# Patient Record
Sex: FEMALE | Race: WHITE | Employment: OTHER | URBAN - METROPOLITAN AREA
[De-identification: names, ages, dates, MRNs, and addresses within clinical notes are randomized per-mention and may not be internally consistent; named-entity substitution may affect disease eponyms.]

---

## 2018-09-26 ENCOUNTER — HOSPITAL ENCOUNTER (OUTPATIENT)
Age: 73
Discharge: HOME | End: 2018-09-28
Payer: COMMERCIAL

## 2018-09-26 DIAGNOSIS — I10 ESSENTIAL HYPERTENSION: ICD-10-CM

## 2018-09-26 DIAGNOSIS — E55.9 VITAMIN D DEFICIENCY: ICD-10-CM

## 2018-09-26 DIAGNOSIS — G44.209 TENSION-TYPE HEADACHE, NOT INTRACTABLE, UNSPECIFIED CHRONICITY PATTERN: ICD-10-CM

## 2018-09-26 DIAGNOSIS — E66.09 CLASS 2 OBESITY DUE TO EXCESS CALORIES WITHOUT SERIOUS COMORBIDITY WITH BODY MASS INDEX (BMI) OF 38.0 TO 38.9 IN ADULT: ICD-10-CM

## 2018-09-26 DIAGNOSIS — E78.5 HYPERLIPIDEMIA, UNSPECIFIED HYPERLIPIDEMIA TYPE: Chronic | ICD-10-CM

## 2018-09-26 DIAGNOSIS — R42 DIZZINESS: ICD-10-CM

## 2018-09-26 PROBLEM — E66.812 CLASS 2 OBESITY DUE TO EXCESS CALORIES WITHOUT SERIOUS COMORBIDITY WITH BODY MASS INDEX (BMI) OF 38.0 TO 38.9 IN ADULT: Status: ACTIVE | Noted: 2018-09-26

## 2018-09-26 PROBLEM — F32.9 REACTIVE DEPRESSION: Status: ACTIVE | Noted: 2018-09-26

## 2018-09-26 PROBLEM — R19.7 DIARRHEA: Status: ACTIVE | Noted: 2018-09-26

## 2018-09-26 LAB
BASOPHILS ABSOLUTE: 0.05 E9/L (ref 0–0.2)
BASOPHILS RELATIVE PERCENT: 0.5 % (ref 0–2)
EOSINOPHILS ABSOLUTE: 0.15 E9/L (ref 0.05–0.5)
EOSINOPHILS RELATIVE PERCENT: 1.6 % (ref 0–6)
HBA1C MFR BLD: 5.8 % (ref 4–5.6)
HCT VFR BLD CALC: 44.1 % (ref 34–48)
HEMOGLOBIN: 14.2 G/DL (ref 11.5–15.5)
IMMATURE GRANULOCYTES #: 0.01 E9/L
IMMATURE GRANULOCYTES %: 0.1 % (ref 0–5)
LYMPHOCYTES ABSOLUTE: 3.18 E9/L (ref 1.5–4)
LYMPHOCYTES RELATIVE PERCENT: 34.3 % (ref 20–42)
MCH RBC QN AUTO: 30.3 PG (ref 26–35)
MCHC RBC AUTO-ENTMCNC: 32.2 % (ref 32–34.5)
MCV RBC AUTO: 94.2 FL (ref 80–99.9)
MONOCYTES ABSOLUTE: 0.71 E9/L (ref 0.1–0.95)
MONOCYTES RELATIVE PERCENT: 7.7 % (ref 2–12)
NEUTROPHILS ABSOLUTE: 5.18 E9/L (ref 1.8–7.3)
NEUTROPHILS RELATIVE PERCENT: 55.8 % (ref 43–80)
PDW BLD-RTO: 15 FL (ref 11.5–15)
PLATELET # BLD: 280 E9/L (ref 130–450)
PMV BLD AUTO: 11.9 FL (ref 7–12)
RBC # BLD: 4.68 E12/L (ref 3.5–5.5)
WBC # BLD: 9.3 E9/L (ref 4.5–11.5)

## 2018-09-26 PROCEDURE — 83036 HEMOGLOBIN GLYCOSYLATED A1C: CPT

## 2018-09-26 PROCEDURE — 81015 MICROSCOPIC EXAM OF URINE: CPT

## 2018-09-26 PROCEDURE — 36415 COLL VENOUS BLD VENIPUNCTURE: CPT

## 2018-09-26 PROCEDURE — 80053 COMPREHEN METABOLIC PANEL: CPT

## 2018-09-26 PROCEDURE — 80061 LIPID PANEL: CPT

## 2018-09-26 PROCEDURE — 83735 ASSAY OF MAGNESIUM: CPT

## 2018-09-26 PROCEDURE — 85651 RBC SED RATE NONAUTOMATED: CPT

## 2018-09-26 PROCEDURE — 85025 COMPLETE CBC W/AUTO DIFF WBC: CPT

## 2018-09-26 PROCEDURE — 84443 ASSAY THYROID STIM HORMONE: CPT

## 2018-09-26 PROCEDURE — 82306 VITAMIN D 25 HYDROXY: CPT

## 2018-09-27 LAB
ALBUMIN SERPL-MCNC: 4.1 G/DL (ref 3.5–5.2)
ALP BLD-CCNC: 104 U/L (ref 35–104)
ALT SERPL-CCNC: 19 U/L (ref 0–32)
ANION GAP SERPL CALCULATED.3IONS-SCNC: 16 MMOL/L (ref 7–16)
AST SERPL-CCNC: 20 U/L (ref 0–31)
BACTERIA: ABNORMAL /HPF
BILIRUB SERPL-MCNC: 0.5 MG/DL (ref 0–1.2)
BUN BLDV-MCNC: 17 MG/DL (ref 8–23)
CALCIUM SERPL-MCNC: 9.2 MG/DL (ref 8.6–10.2)
CHLORIDE BLD-SCNC: 103 MMOL/L (ref 98–107)
CHOLESTEROL, TOTAL: 124 MG/DL (ref 0–199)
CO2: 22 MMOL/L (ref 22–29)
CREAT SERPL-MCNC: 1 MG/DL (ref 0.5–1)
EPITHELIAL CELLS, UA: ABNORMAL /HPF
GFR AFRICAN AMERICAN: >60
GFR NON-AFRICAN AMERICAN: 55 ML/MIN/1.73
GLUCOSE BLD-MCNC: 83 MG/DL (ref 74–109)
HDLC SERPL-MCNC: 53 MG/DL
LDL CHOLESTEROL CALCULATED: 55 MG/DL (ref 0–99)
MAGNESIUM: 1.8 MG/DL (ref 1.6–2.6)
POTASSIUM SERPL-SCNC: 4.3 MMOL/L (ref 3.5–5)
RBC UA: ABNORMAL /HPF (ref 0–2)
SEDIMENTATION RATE, ERYTHROCYTE: 27 MM/HR (ref 0–20)
SODIUM BLD-SCNC: 141 MMOL/L (ref 132–146)
TOTAL PROTEIN: 7.8 G/DL (ref 6.4–8.3)
TRIGL SERPL-MCNC: 78 MG/DL (ref 0–149)
TSH SERPL DL<=0.05 MIU/L-ACNC: 0.86 UIU/ML (ref 0.27–4.2)
VITAMIN D 25-HYDROXY: 37 NG/ML (ref 30–100)
VLDLC SERPL CALC-MCNC: 16 MG/DL
WBC UA: >20 /HPF (ref 0–5)

## 2019-01-02 PROBLEM — F33.41 RECURRENT MAJOR DEPRESSIVE DISORDER, IN PARTIAL REMISSION (HCC): Status: ACTIVE | Noted: 2019-01-02

## 2019-10-01 ENCOUNTER — OFFICE VISIT (OUTPATIENT)
Dept: PHYSICAL MEDICINE AND REHAB | Age: 74
End: 2019-10-01
Payer: COMMERCIAL

## 2019-10-01 VITALS
SYSTOLIC BLOOD PRESSURE: 106 MMHG | HEART RATE: 75 BPM | BODY MASS INDEX: 35.44 KG/M2 | HEIGHT: 63 IN | DIASTOLIC BLOOD PRESSURE: 62 MMHG | WEIGHT: 200 LBS

## 2019-10-01 DIAGNOSIS — G56.03 BILATERAL CARPAL TUNNEL SYNDROME: Primary | ICD-10-CM

## 2019-10-01 PROCEDURE — 99203 OFFICE O/P NEW LOW 30 MIN: CPT | Performed by: PHYSICAL MEDICINE & REHABILITATION

## 2019-10-14 ENCOUNTER — OFFICE VISIT (OUTPATIENT)
Dept: PHYSICAL MEDICINE AND REHAB | Age: 74
End: 2019-10-14
Payer: COMMERCIAL

## 2019-10-14 VITALS — HEIGHT: 63 IN | WEIGHT: 200 LBS | BODY MASS INDEX: 35.44 KG/M2

## 2019-10-14 DIAGNOSIS — G56.03 BILATERAL CARPAL TUNNEL SYNDROME: ICD-10-CM

## 2019-10-14 PROCEDURE — 95886 MUSC TEST DONE W/N TEST COMP: CPT | Performed by: PHYSICAL MEDICINE & REHABILITATION

## 2019-10-14 PROCEDURE — 95910 NRV CNDJ TEST 7-8 STUDIES: CPT | Performed by: PHYSICAL MEDICINE & REHABILITATION

## 2019-10-15 ENCOUNTER — OFFICE VISIT (OUTPATIENT)
Dept: PHYSICAL MEDICINE AND REHAB | Age: 74
End: 2019-10-15
Payer: COMMERCIAL

## 2019-10-15 VITALS
HEART RATE: 67 BPM | DIASTOLIC BLOOD PRESSURE: 57 MMHG | BODY MASS INDEX: 35.44 KG/M2 | WEIGHT: 200 LBS | SYSTOLIC BLOOD PRESSURE: 115 MMHG | HEIGHT: 63 IN

## 2019-10-15 DIAGNOSIS — G56.01 RIGHT CARPAL TUNNEL SYNDROME: Primary | ICD-10-CM

## 2019-10-15 DIAGNOSIS — G56.01 RIGHT CARPAL TUNNEL SYNDROME: ICD-10-CM

## 2019-10-15 PROCEDURE — 20526 THER INJECTION CARP TUNNEL: CPT | Performed by: PHYSICAL MEDICINE & REHABILITATION

## 2019-10-15 RX ORDER — LIDOCAINE HYDROCHLORIDE 10 MG/ML
2 INJECTION, SOLUTION INFILTRATION; PERINEURAL ONCE
Status: COMPLETED | OUTPATIENT
Start: 2019-10-15 | End: 2019-10-15

## 2019-10-15 RX ORDER — TRIAMCINOLONE ACETONIDE 40 MG/ML
40 INJECTION, SUSPENSION INTRA-ARTICULAR; INTRAMUSCULAR ONCE
Status: COMPLETED | OUTPATIENT
Start: 2019-10-15 | End: 2019-10-15

## 2019-10-15 RX ADMIN — TRIAMCINOLONE ACETONIDE 40 MG: 40 INJECTION, SUSPENSION INTRA-ARTICULAR; INTRAMUSCULAR at 14:41

## 2019-10-15 RX ADMIN — LIDOCAINE HYDROCHLORIDE 2 ML: 10 INJECTION, SOLUTION INFILTRATION; PERINEURAL at 14:40

## 2019-10-22 ENCOUNTER — OFFICE VISIT (OUTPATIENT)
Dept: PHYSICAL MEDICINE AND REHAB | Age: 74
End: 2019-10-22
Payer: COMMERCIAL

## 2019-10-22 VITALS
HEIGHT: 63 IN | HEART RATE: 65 BPM | BODY MASS INDEX: 35.44 KG/M2 | DIASTOLIC BLOOD PRESSURE: 70 MMHG | WEIGHT: 200 LBS | SYSTOLIC BLOOD PRESSURE: 122 MMHG

## 2019-10-22 DIAGNOSIS — G56.02 LEFT CARPAL TUNNEL SYNDROME: ICD-10-CM

## 2019-10-22 DIAGNOSIS — G56.02 LEFT CARPAL TUNNEL SYNDROME: Primary | ICD-10-CM

## 2019-10-22 PROCEDURE — 20526 THER INJECTION CARP TUNNEL: CPT | Performed by: PHYSICAL MEDICINE & REHABILITATION

## 2019-10-22 RX ORDER — TRIAMCINOLONE ACETONIDE 40 MG/ML
40 INJECTION, SUSPENSION INTRA-ARTICULAR; INTRAMUSCULAR ONCE
Status: COMPLETED | OUTPATIENT
Start: 2019-10-22 | End: 2019-10-22

## 2019-10-22 RX ORDER — LIDOCAINE HYDROCHLORIDE 10 MG/ML
2 INJECTION, SOLUTION INFILTRATION; PERINEURAL ONCE
Status: COMPLETED | OUTPATIENT
Start: 2019-10-22 | End: 2019-10-22

## 2019-10-22 RX ADMIN — LIDOCAINE HYDROCHLORIDE 2 ML: 10 INJECTION, SOLUTION INFILTRATION; PERINEURAL at 14:23

## 2019-10-22 RX ADMIN — TRIAMCINOLONE ACETONIDE 40 MG: 40 INJECTION, SUSPENSION INTRA-ARTICULAR; INTRAMUSCULAR at 14:23

## 2020-03-25 ENCOUNTER — HOSPITAL ENCOUNTER (OUTPATIENT)
Age: 75
Discharge: HOME | End: 2020-03-27
Payer: COMMERCIAL

## 2020-03-25 LAB
ALBUMIN SERPL-MCNC: 4.1 G/DL (ref 3.5–5.2)
ALP BLD-CCNC: 108 U/L (ref 35–104)
ALT SERPL-CCNC: 24 U/L (ref 0–32)
ANION GAP SERPL CALCULATED.3IONS-SCNC: 15 MMOL/L (ref 7–16)
AST SERPL-CCNC: 25 U/L (ref 0–31)
BACTERIA: ABNORMAL /HPF
BASOPHILS ABSOLUTE: 0.05 E9/L (ref 0–0.2)
BASOPHILS RELATIVE PERCENT: 0.5 % (ref 0–2)
BILIRUB SERPL-MCNC: 0.5 MG/DL (ref 0–1.2)
BILIRUBIN URINE: NEGATIVE
BLOOD, URINE: NEGATIVE
BUN BLDV-MCNC: 17 MG/DL (ref 8–23)
CALCIUM SERPL-MCNC: 9.7 MG/DL (ref 8.6–10.2)
CHLORIDE BLD-SCNC: 103 MMOL/L (ref 98–107)
CHOLESTEROL, TOTAL: 159 MG/DL (ref 0–199)
CLARITY: CLEAR
CO2: 22 MMOL/L (ref 22–29)
COLOR: YELLOW
CREAT SERPL-MCNC: 1 MG/DL (ref 0.5–1)
EOSINOPHILS ABSOLUTE: 0.11 E9/L (ref 0.05–0.5)
EOSINOPHILS RELATIVE PERCENT: 1.1 % (ref 0–6)
GFR AFRICAN AMERICAN: >60
GFR NON-AFRICAN AMERICAN: 54 ML/MIN/1.73
GLUCOSE BLD-MCNC: 95 MG/DL (ref 74–99)
GLUCOSE URINE: NEGATIVE MG/DL
HBA1C MFR BLD: 5.8 % (ref 4–5.6)
HCT VFR BLD CALC: 46.6 % (ref 34–48)
HDLC SERPL-MCNC: 51 MG/DL
HEMOGLOBIN: 14.6 G/DL (ref 11.5–15.5)
IMMATURE GRANULOCYTES #: 0.03 E9/L
IMMATURE GRANULOCYTES %: 0.3 % (ref 0–5)
KETONES, URINE: NEGATIVE MG/DL
LDL CHOLESTEROL CALCULATED: 88 MG/DL (ref 0–99)
LEUKOCYTE ESTERASE, URINE: ABNORMAL
LYMPHOCYTES ABSOLUTE: 3.11 E9/L (ref 1.5–4)
LYMPHOCYTES RELATIVE PERCENT: 31.5 % (ref 20–42)
MCH RBC QN AUTO: 30.4 PG (ref 26–35)
MCHC RBC AUTO-ENTMCNC: 31.3 % (ref 32–34.5)
MCV RBC AUTO: 96.9 FL (ref 80–99.9)
MONOCYTES ABSOLUTE: 0.7 E9/L (ref 0.1–0.95)
MONOCYTES RELATIVE PERCENT: 7.1 % (ref 2–12)
NEUTROPHILS ABSOLUTE: 5.88 E9/L (ref 1.8–7.3)
NEUTROPHILS RELATIVE PERCENT: 59.5 % (ref 43–80)
NITRITE, URINE: NEGATIVE
PDW BLD-RTO: 14.1 FL (ref 11.5–15)
PH UA: 5.5 (ref 5–9)
PLATELET # BLD: 331 E9/L (ref 130–450)
PMV BLD AUTO: 11.8 FL (ref 7–12)
POTASSIUM SERPL-SCNC: 4.8 MMOL/L (ref 3.5–5)
PROTEIN UA: NEGATIVE MG/DL
RBC # BLD: 4.81 E12/L (ref 3.5–5.5)
RBC UA: ABNORMAL /HPF (ref 0–2)
SEDIMENTATION RATE, ERYTHROCYTE: 39 MM/HR (ref 0–20)
SODIUM BLD-SCNC: 140 MMOL/L (ref 132–146)
SPECIFIC GRAVITY UA: >=1.03 (ref 1–1.03)
TOTAL PROTEIN: 8.4 G/DL (ref 6.4–8.3)
TRIGL SERPL-MCNC: 102 MG/DL (ref 0–149)
TSH SERPL DL<=0.05 MIU/L-ACNC: 1.15 UIU/ML (ref 0.27–4.2)
UROBILINOGEN, URINE: 0.2 E.U./DL
VITAMIN D 25-HYDROXY: 35 NG/ML (ref 30–100)
VLDLC SERPL CALC-MCNC: 20 MG/DL
WBC # BLD: 9.9 E9/L (ref 4.5–11.5)
WBC UA: >20 /HPF (ref 0–5)

## 2020-03-25 PROCEDURE — 86787 VARICELLA-ZOSTER ANTIBODY: CPT

## 2020-03-25 PROCEDURE — 80053 COMPREHEN METABOLIC PANEL: CPT

## 2020-03-25 PROCEDURE — 83036 HEMOGLOBIN GLYCOSYLATED A1C: CPT

## 2020-03-25 PROCEDURE — 81001 URINALYSIS AUTO W/SCOPE: CPT

## 2020-03-25 PROCEDURE — 86695 HERPES SIMPLEX TYPE 1 TEST: CPT

## 2020-03-25 PROCEDURE — 86696 HERPES SIMPLEX TYPE 2 TEST: CPT

## 2020-03-25 PROCEDURE — 85651 RBC SED RATE NONAUTOMATED: CPT

## 2020-03-25 PROCEDURE — 84443 ASSAY THYROID STIM HORMONE: CPT

## 2020-03-25 PROCEDURE — 80061 LIPID PANEL: CPT

## 2020-03-25 PROCEDURE — 85025 COMPLETE CBC W/AUTO DIFF WBC: CPT

## 2020-03-25 PROCEDURE — 86694 HERPES SIMPLEX NES ANTBDY: CPT

## 2020-03-25 PROCEDURE — 82306 VITAMIN D 25 HYDROXY: CPT

## 2020-03-26 LAB — VARICELLA-ZOSTER VIRUS AB, IGG: NORMAL

## 2020-03-29 LAB
HERPES TYPE 1/2 IGM COMBINED: 0.63 IV
HERPES TYPE I/II IGG COMBINED: >22.4 IV
HSV 1 GLYCOPROTEIN G AB IGG: 55.5 IV
HSV 2 GLYCOPROTEIN G AB IGG: 0.15 IV

## 2020-03-31 LAB — VARICELLA ZOSTER AB IGM: 0.6 ISR

## 2020-09-28 ENCOUNTER — OFFICE VISIT (OUTPATIENT)
Dept: PHYSICAL MEDICINE AND REHAB | Age: 75
End: 2020-09-28
Payer: COMMERCIAL

## 2020-09-28 VITALS
DIASTOLIC BLOOD PRESSURE: 75 MMHG | BODY MASS INDEX: 38.08 KG/M2 | RESPIRATION RATE: 20 BRPM | HEIGHT: 63 IN | SYSTOLIC BLOOD PRESSURE: 132 MMHG | WEIGHT: 214.9 LBS | HEART RATE: 80 BPM

## 2020-09-28 PROCEDURE — 99213 OFFICE O/P EST LOW 20 MIN: CPT | Performed by: PHYSICAL MEDICINE & REHABILITATION

## 2020-09-28 RX ORDER — CALCIUM CARBONATE 500(1250)
500 TABLET ORAL DAILY
COMMUNITY
End: 2021-07-07

## 2020-09-28 NOTE — PROGRESS NOTES
Romario Lopez, [id number] [facility]  [address] [facility]  Phone: [phone]  Fax: [phone]    PCP: Je Stoddard DO  Date of visit: 9/28/20    Chief Complaint   Patient presents with    Hand Pain     followup      Interval:   Patient presents today for worsening right hand pain and weakness. Her pain restarted 6 weeks ago. She had right carpal tunnel injection last year and did well. She is not interested in any further injections. Worse at night and in the morning. The pain is rated Pain Score:  10 - Worst pain ever. The numbness in in her fingers. She reports dropping things. Nothing makes it better. The prior workup has included: EMG     The prior treatment has included:  Bracing   CT injection    No Known Allergies    Current Outpatient Medications   Medication Sig Dispense Refill    calcium carbonate (OSCAL) 500 MG TABS tablet Take 500 mg by mouth daily      atorvastatin (LIPITOR) 40 MG tablet Take 1 tablet by mouth daily 90 tablet 1    spironolactone (ALDACTONE) 25 MG tablet TAKE ONE TABLET BY MOUTH DAILY 90 tablet 0    atenolol (TENORMIN) 25 MG tablet TAKE ONE TABLET BY MOUTH EVERY DAY 90 tablet 1    citalopram (CELEXA) 20 MG tablet Indications: 1 and 1/2 tab daily Pt is to take 1 and 1/2 tabs daily (30mg) 135 tablet 2    ramipril (ALTACE) 2.5 MG capsule Take 1 capsule by mouth daily 90 capsule 2    meloxicam (MOBIC) 7.5 MG tablet Take 1 tablet by mouth daily as needed for Pain 90 tablet 1    Biotin 77120 MCG TABS Take 10,000 mcg by mouth daily      Glucosamine-Chondroit-Vit C-Mn (GLUCOSAMINE 1500 COMPLEX) CAPS Take 1 capsule by mouth       No current facility-administered medications for this visit.         Past Medical History:   Diagnosis Date    Hypertension     Mitral valve regurgitation     Psychiatric problem     depression       Past Surgical History:   Procedure Laterality Date    BLADDER REPAIR  CATARACT REMOVAL WITH IMPLANT      bilateral    CERVICAL SPINE SURGERY      6th and 7th fusion    CHOLECYSTECTOMY      ECHO COMPL W DOP COLOR FLOW  2013         HERNIA REPAIR      HYSTERECTOMY      NERVE BLOCK Left 6 9 16    sacroiliac joint #1       Family History   Problem Relation Age of Onset    Other Mother         CHF  at 80    Cancer Father          of pancreatic cancer at 67    Diabetes Sister     Other Sister         Blood clots    Stroke Sister     Stroke Brother          of massive stroke at 62    No Known Problems Brother        Social History     Tobacco Use    Smoking status: Never Smoker    Smokeless tobacco: Never Used   Substance Use Topics    Alcohol use: No    Drug use: No          Functional Status: The patient is able to ambulate and perform activities of daily living without the use of an assistive device. 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 ROS:    Constitutional: Denies fevers, chills, night sweats, unintentional weight loss     Skin: Denies rash or skin changes     Eyes: Denies vision changes    Ears/Nose/Throat: Denies nasal congestion or sore throat     Respiratory: Denies SOB or cough     Cardiovascular: Denies CP, palpitations, edema      Gastrointestinal: Denies abdominal pain,  N/V, constipation, or diarrhea    Genitourinary: Denies urinary symptoms    Neurologic: See HPI.     MSK: See HPI. Psychiatric: Denies sleep disturbance, anxiety, depression    Hematologic/Lymphatic/Immunologic: Denies bruising       Physical Exam:   Blood pressure 132/75, pulse 80, resp. rate 20, height 5' 3\" (1.6 m), weight 214 lb 14.4 oz (97.5 kg), not currently breastfeeding. General: well developed and well nourished in no acute distress. HEENT: No rhinorrhea, sneezing, yawning, or lacrimation. No scleral icterus or conjunctival injection. Resp: symmetrical chest expansion, unlabored breathing, respirations unlabored.

## 2020-10-06 ENCOUNTER — OFFICE VISIT (OUTPATIENT)
Dept: ORTHOPEDIC SURGERY | Age: 75
End: 2020-10-06
Payer: MEDICARE

## 2020-10-06 VITALS — BODY MASS INDEX: 37.92 KG/M2 | HEIGHT: 63 IN | WEIGHT: 214 LBS

## 2020-10-06 PROBLEM — G56.01 RIGHT CARPAL TUNNEL SYNDROME: Status: ACTIVE | Noted: 2020-10-06

## 2020-10-06 PROBLEM — G56.02 LEFT CARPAL TUNNEL SYNDROME: Status: ACTIVE | Noted: 2020-10-06

## 2020-10-06 PROBLEM — E66.01 MORBIDLY OBESE (HCC): Status: ACTIVE | Noted: 2018-09-26

## 2020-10-06 PROCEDURE — 99204 OFFICE O/P NEW MOD 45 MIN: CPT | Performed by: ORTHOPAEDIC SURGERY

## 2020-10-06 NOTE — PROGRESS NOTES
.  Chief Complaint   Patient presents with    Carpal Tunnel     Bilateral carpal tunnel with right worse then left. C/o tingling numbing and pain. She has tried wrist bracews without much relief. Symptoms have been going on for a couple years. Patient also had injections in the carpal tunnel which hlelped for a couple weeks. Bonita Thornton is a 69y.o. year old  female who presents for evaluation of bilateral wrist pain. she reports this started several months ago. she does not remember a specific injury that started the pain. .  The injury was none. The pain is located mainly over 1-3 . The pain is worse with numbness and better with rest.  The patient has tried csi and bracing. The treatment has not been effective. The patient is right dominant.     Past Medical History:   Diagnosis Date    Hypertension     Mitral valve regurgitation     Psychiatric problem     depression     Past Surgical History:   Procedure Laterality Date    BLADDER REPAIR      CATARACT REMOVAL WITH IMPLANT      bilateral    CERVICAL SPINE SURGERY      6th and 7th fusion    CHOLECYSTECTOMY      ECHO COMPL W DOP COLOR FLOW  12/18/2013         HERNIA REPAIR      HYSTERECTOMY      NERVE BLOCK Left 6 9 16    sacroiliac joint #1       Current Outpatient Medications:     calcium carbonate (OSCAL) 500 MG TABS tablet, Take 500 mg by mouth daily, Disp: , Rfl:     atorvastatin (LIPITOR) 40 MG tablet, Take 1 tablet by mouth daily, Disp: 90 tablet, Rfl: 1    spironolactone (ALDACTONE) 25 MG tablet, TAKE ONE TABLET BY MOUTH DAILY, Disp: 90 tablet, Rfl: 0    atenolol (TENORMIN) 25 MG tablet, TAKE ONE TABLET BY MOUTH EVERY DAY, Disp: 90 tablet, Rfl: 1    citalopram (CELEXA) 20 MG tablet, Indications: 1 and 1/2 tab daily Pt is to take 1 and 1/2 tabs daily (30mg), Disp: 135 tablet, Rfl: 2    ramipril (ALTACE) 2.5 MG capsule, Take 1 capsule by mouth daily, Disp: 90 capsule, Rfl: 2    meloxicam (MOBIC) 7.5 MG tablet, Take 1 tablet by mouth daily as needed for Pain, Disp: 90 tablet, Rfl: 1    Biotin 22125 MCG TABS, Take 10,000 mcg by mouth daily, Disp: , Rfl:     Glucosamine-Chondroit-Vit C-Mn (GLUCOSAMINE 1500 COMPLEX) CAPS, Take 1 capsule by mouth, Disp: , Rfl:   No Known Allergies  Social History     Socioeconomic History    Marital status:      Spouse name: Not on file    Number of children: 3    Years of education: 15    Highest education level: Not on file   Occupational History    Not on file   Social Needs    Financial resource strain: Not hard at all   ColibrÃ­ insecurity     Worry: Never true     Inability: Never true   [employer] needs     Medical: No     Non-medical: No   Tobacco Use    Smoking status: Never Smoker    Smokeless tobacco: Never Used   Substance and Sexual Activity    Alcohol use: No    Drug use: No    Sexual activity: Not Currently     Partners: Male   Lifestyle    Physical activity     Days per week: 5 days     Minutes per session: 30 min    Stress: Not at all   Relationships    Social connections     Talks on phone: Twice a week     Gets together: More than three times a week     Attends [employer] service: More than 4 times per year     Active member of club or organization: No     Attends meetings of clubs or organizations: Never     Relationship status:      Intimate partner violence     Fear of current or ex partner: No     Emotionally abused: No     Physically abused: No     Forced sexual activity: No   Other Topics Concern    Not on file   Social History Narrative    Not on file     Family History   Problem Relation Age of Onset    Other Mother         CHF  at 80    Cancer Father          of pancreatic cancer at 67    Diabetes Sister     Other Sister         Blood clots    Stroke Sister     Stroke Brother          of massive stroke at 62    No Known Problems Brother        REVIEW OF SYSTEMS:     General/Constitution:  (-)weight loss, (-)fever, (-)chills, (-)weakness. Skin: (-) rash,(-) psoriasis,(-) eczema, (-)skin cancer. Musculoskeletal: (-) fractures,  (-) dislocations,(-) collagen vascular disease, (-) fibromyalgia, (-) multiple sclerosis, (-) muscular dystrophy, (-) RSD,(-) joint pain (-)swelling, (-) joint pain,swelling. Neurologic: (-) epilepsy, (-)seizures,(-) brain tumor,(-) TIA, (-)stroke, (-)headaches, (-)Parkinson disease,(-) memory loss, (-) LOC. Cardiovascular: (-) Chest pain, (-) swelling in legs/feet, (-) SOB, (-) cramping in legs/feet with walking. Respiratory: (-) SOB, (-) Coughing, (-) night sweats. GI: (-) nausea, (-) vomiting, (-) diarrhea, (-) blood in stool, (-) gastric ulcer. Psychiatric: (-) Depression, (-) Anxiety, (-) bipolar disease, (-) Alzheimer's Disease  Allergic/Immunologic: (-) allergies latex, (-) allergies metal, (-) skin sensitivity. Hematlogic: (-) anemia, (-) blood transfusion, (-) DVT/PE, (-) Clotting disorders      Subjective:  _Ht 5' 3\" (1.6 m)   Wt 214 lb (97.1 kg)   BMI 37.91 kg/m²  Vital signs are stable. In general, patient is awake, alert and oriented X3, in no apparent distress. Examination of HENT reveals normocephalic, atraumatic. PERRLA/EOMI sclera are white. Conjunctivae are clear. TM's are intact. Pharynx is pink and moist.  Uvula and tongue are midline. Heart: Positive S1 and positive S2 with regular rate and rhythm. Lungs: Clear to auscultation bilaterally without rales, rhonchi or wheezes. Abdomen: soft, nontender. Positive bowel sounds. No organomegaly. No guarding or rigidity. Constitution:  Ht 5' 3\" (1.6 m)   Wt 214 lb (97.1 kg)   BMI 37.91 kg/m²     Psycihatric:  The patient is alert and oriented x 3, appears to be stated age and in no distress. Respiratory:  Respiratory effort is not labored. Patient is not gasping. Palpation of the chest reveals no tactile fremitus. Skin:  Upon inspection: the skin appears warm, dry and intact.   There is not a previous scar over the affected area. There is not any cellulitis, lymphedema or cutaneous lesions noted in the lower extremities. Upon palpation there is no induration noted. Neurologic:  Motor exam of the upper extremities show: The reflexes in biceps/triceps/brachioradialis are equal and symmetric. Sensory exam C5-T1 are normal bilaterally. Cardiovascular: The vascular exam is normal and is well perfused to distal extremities. There are 2+ radial pulses bilaterally, and motor and sensation is intact to ulnar, and radial, musclocutaneus, and axillary nerve distribution and grossly symmetric bilaterally. There is cap refill noted less than two seconds in all digits. There is not edema of the bilateral upper extremities. There is not varicosities noted in the distal extremities. Median nerve distribution altered    Lymph:  Upon palpation,  there is no lymphadenopathy noted in bilateral upper extremities. Musculoskeletal:  Gait: normal; examination of the nails and digits reveal no cyanosis or clubbing. Cervical Exam:  On physical exam, Deborah Betancourt is well-developed, well-nourished, oriented to person, place and time. her gait is normal.  On evaluation of her cervical spine, she has full range of motion of the cervical spine without pain. There is no cervical tenderness to palpation. Shoulder Exam:  On evaluation of her bilaterally upper extremities, her bilateral shoulder has no deformity. There is not evidence of scapular dyskinesis. There is not muscle atrophy in shoulder girdle. The range of motion for the Right Shoulder is 150/50/t8 and for the Left shoulder is 150/50/t8. Right shoulder Motor strength is 5/5 in the supraspinatus, 5/5 internal rotation and 5/5 in external rotation, and Left shoulder motor strength 5/5 in supraspinatus, 5/5 in internal rotation, 5/5 in external rotation. Elbow exam:  Evaluation of the elbow, reveals no signs of swelling or deformity. ROM is 0-140. electrodiagnostic evidence for C5-T1 motor radiculopathy in bilateral upper extremities.      Previous Study: None       Follow up EMG is recommended if clinically indicated.      Discussed treatment options including conservative vs surgical treatment. She wants to try braces and injections. Will schedule. Radiographic findings reviewed with patient    Impression:   Encounter Diagnoses   Name Primary?  Right carpal tunnel syndrome Yes    Left carpal tunnel syndrome        Plan: Natural history and expected course discussed. Questions answered. Educational materials distributed. Rest, ice, compression, and elevation (RICE) therapy. Reduction in offending activity discussed. I had a lengthy discussion with the patient regarding their diagnosis. I explained treatment options including surgical vs non surgical treatment. I reviewed in detail the risks and benefits and outlined the procedure in detail with expected outcomes and possible complications. I also discussed non surgical treatment such as injections (CSI), physical therapy, topical creams and NSAID's. They have elected for surgical management at this time. We will perform a Right carpal tunnel release 11/3/2020. The risks and benefits were reviewed with the patient such as:  DVT, infection,  injuries to blood vessels and nerves, non relief of symptoms, continued pain, worsening of symptoms. The patient was counseled at length about the risks of sujit Covid-19 during their perioperative period and any recovery window from their procedure. The patient was made aware that sujit Covid-19  may worsen their prognosis for recovering from their procedure  and lend to a higher morbidity and/or mortality risk. All material risks, benefits, and reasonable alternatives including postponing the procedure were discussed. The patient does wish to proceed with the procedure at this time.

## 2020-10-28 ENCOUNTER — HOSPITAL ENCOUNTER (OUTPATIENT)
Age: 75
Discharge: HOME | End: 2020-10-28
Payer: MEDICARE

## 2020-10-28 ENCOUNTER — HOSPITAL ENCOUNTER (OUTPATIENT)
Age: 75
Discharge: HOME | End: 2020-10-30
Payer: MEDICARE

## 2020-10-28 LAB
ANION GAP SERPL CALCULATED.3IONS-SCNC: 10 MMOL/L (ref 7–16)
BUN BLDV-MCNC: 18 MG/DL (ref 8–23)
CALCIUM SERPL-MCNC: 9.6 MG/DL (ref 8.6–10.2)
CHLORIDE BLD-SCNC: 101 MMOL/L (ref 98–107)
CO2: 27 MMOL/L (ref 22–29)
CREAT SERPL-MCNC: 1.1 MG/DL (ref 0.5–1)
EKG ATRIAL RATE: 65 BPM
EKG P AXIS: 24 DEGREES
EKG P-R INTERVAL: 184 MS
EKG Q-T INTERVAL: 426 MS
EKG QRS DURATION: 78 MS
EKG QTC CALCULATION (BAZETT): 443 MS
EKG R AXIS: 43 DEGREES
EKG T AXIS: 116 DEGREES
EKG VENTRICULAR RATE: 65 BPM
GFR AFRICAN AMERICAN: 59
GFR NON-AFRICAN AMERICAN: 49 ML/MIN/1.73
GLUCOSE BLD-MCNC: 106 MG/DL (ref 74–99)
POTASSIUM SERPL-SCNC: 4.2 MMOL/L (ref 3.5–5)
SODIUM BLD-SCNC: 138 MMOL/L (ref 132–146)

## 2020-10-28 PROCEDURE — 80048 BASIC METABOLIC PNL TOTAL CA: CPT

## 2020-10-28 PROCEDURE — U0003 INFECTIOUS AGENT DETECTION BY NUCLEIC ACID (DNA OR RNA); SEVERE ACUTE RESPIRATORY SYNDROME CORONAVIRUS 2 (SARS-COV-2) (CORONAVIRUS DISEASE [COVID-19]), AMPLIFIED PROBE TECHNIQUE, MAKING USE OF HIGH THROUGHPUT TECHNOLOGIES AS DESCRIBED BY CMS-2020-01-R: HCPCS

## 2020-10-28 PROCEDURE — 36415 COLL VENOUS BLD VENIPUNCTURE: CPT

## 2020-10-28 PROCEDURE — 93005 ELECTROCARDIOGRAM TRACING: CPT | Performed by: ANESTHESIOLOGY

## 2020-10-30 LAB
SARS-COV-2: NOT DETECTED
SOURCE: NORMAL

## 2020-11-02 ENCOUNTER — ANESTHESIA EVENT (OUTPATIENT)
Dept: OPERATING ROOM | Age: 75
End: 2020-11-02
Payer: MEDICARE

## 2020-11-02 NOTE — ANESTHESIA PRE PROCEDURE
Department of Anesthesiology  Preprocedure Note       Name:  Cristina Madden   Age:  70 y.o.  :  1945                                          MRN:  [id number]         Date:  2020      Surgeon: Fernando Rodrigez): Lum Fothergill, DO    Procedure: Procedure(s):  RIGHT WRIST CARPAL TUNNEL RELEASE    Medications prior to admission:   Prior to Admission medications    Medication Sig Start Date End Date Taking? Authorizing Provider   calcium carbonate (OSCAL) 500 MG TABS tablet Take 500 mg by mouth daily    Historical Provider, MD   atorvastatin (LIPITOR) 40 MG tablet Take 1 tablet by mouth daily 20   Justen Cespedes, DO   spironolactone (ALDACTONE) 25 MG tablet TAKE ONE TABLET BY MOUTH DAILY 9/15/20   Justen Cespedes, DO   atenolol (TENORMIN) 25 MG tablet TAKE ONE TABLET BY MOUTH EVERY DAY 20   Justen Cespedes, DO   citalopram (CELEXA) 20 MG tablet Indications: 1 and 1/2 tab daily Pt is to take 1 and 1/2 tabs daily (30mg) 20   Justen Cespedes DO   ramipril (ALTACE) 2.5 MG capsule Take 1 capsule by mouth daily 3/25/20   Justen Cespedes DO   meloxicam (MOBIC) 7.5 MG tablet Take 1 tablet by mouth daily as needed for Pain 3/25/20   Devaughn Cespedes DO   Biotin 20524 MCG TABS Take 10,000 mcg by mouth daily    Historical Provider, MD   Glucosamine-Chondroit-Vit C-Mn (GLUCOSAMINE 1500 COMPLEX) CAPS Take 1 capsule by mouth    Historical Provider, MD       Current medications:    No current facility-administered medications for this encounter.       Current Outpatient Medications   Medication Sig Dispense Refill    calcium carbonate (OSCAL) 500 MG TABS tablet Take 500 mg by mouth daily      atorvastatin (LIPITOR) 40 MG tablet Take 1 tablet by mouth daily 90 tablet 1    spironolactone (ALDACTONE) 25 MG tablet TAKE ONE TABLET BY MOUTH DAILY 90 tablet 0    atenolol (TENORMIN) 25 MG tablet TAKE ONE TABLET BY MOUTH EVERY DAY 90 tablet 1    citalopram (CELEXA) 20 MG tablet Indications: 1 and 1/2 tab daily Pt is to take 1 and 1/2 tabs daily (30mg) 135 tablet 2    ramipril (ALTACE) 2.5 MG capsule Take 1 capsule by mouth daily 90 capsule 2    meloxicam (MOBIC) 7.5 MG tablet Take 1 tablet by mouth daily as needed for Pain 90 tablet 1    Biotin 20966 MCG TABS Take 10,000 mcg by mouth daily      Glucosamine-Chondroit-Vit C-Mn (GLUCOSAMINE 1500 COMPLEX) CAPS Take 1 capsule by mouth         Allergies:  No Known Allergies    Problem List:    Patient Active Problem List   Diagnosis Code    Anxiety F41.9    Hyperlipidemia E78.5    Vitamin D deficiency E55.9    Morbidly obese ([facility] [address] E66.01    Reactive depression F32.9    Essential hypertension I10    Diarrhea R19.7    Recurrent major depressive disorder, in partial remission ([facility] [address] F33.41    Right carpal tunnel syndrome G56.01    Left carpal tunnel syndrome G56.02       Past Medical History:        Diagnosis Date    Carpal tunnel syndrome 2020    right wrist    Hypertension     Mitral valve regurgitation     Psychiatric problem     depression       Past Surgical History:        Procedure Laterality Date    BLADDER REPAIR      CATARACT REMOVAL WITH IMPLANT  04/2020    bilateral    CERVICAL SPINE SURGERY  1980 6th and 7th fusion    CHOLECYSTECTOMY      ECHO COMPL W DOP COLOR FLOW  12/18/2013         HERNIA REPAIR      HYSTERECTOMY      NERVE BLOCK Left 6 9 16    sacroiliac joint #1       Social History:    Social History     Tobacco Use    Smoking status: Never Smoker    Smokeless tobacco: Never Used   Substance Use Topics    Alcohol use:  No                                Counseling given: Not Answered      Vital Signs (Current):   Vitals:    10/26/20 1533   Weight: 214 lb (97.1 kg)   Height: 5' 3\" (1.6 m)                                              BP Readings from Last 3 Encounters:   09/28/20 132/75   03/25/20 124/70   10/22/19 122/70       NPO Status:  >8.H                                                                               BMI:   Wt Readings from Last 3 Encounters:   10/06/20 214 lb (97.1 kg)   09/28/20 214 lb 14.4 oz (97.5 kg)   03/25/20 208 lb (94.3 kg)     Body mass index is 37.91 kg/m². CBC:   Lab Results   Component Value Date    WBC 9.9 03/25/2020    RBC 4.81 03/25/2020    HGB 14.6 03/25/2020    HCT 46.6 03/25/2020    MCV 96.9 03/25/2020    RDW 14.1 03/25/2020     03/25/2020       CMP:   Lab Results   Component Value Date     10/28/2020    K 4.2 10/28/2020     10/28/2020    CO2 27 10/28/2020    BUN 18 10/28/2020    CREATININE 1.1 10/28/2020    GFRAA 59 10/28/2020    LABGLOM 49 10/28/2020    GLUCOSE 106 10/28/2020    GLUCOSE 89 04/25/2012    PROT 8.4 03/25/2020    CALCIUM 9.6 10/28/2020    BILITOT 0.5 03/25/2020    ALKPHOS 108 03/25/2020    AST 25 03/25/2020    ALT 24 03/25/2020       POC Tests: No results for input(s): POCGLU, POCNA, POCK, POCCL, POCBUN, POCHEMO, POCHCT in the last 72 hours. Coags:   Lab Results   Component Value Date    PROTIME 13.2 12/17/2013    INR 1.3 12/17/2013    APTT 29.2 12/17/2013       HCG (If Applicable): No results found for: PREGTESTUR, PREGSERUM, HCG, HCGQUANT     ABGs: No results found for: PHART, PO2ART, EHK7EYH, BLJ3OAA, BEART, T7FISGLQ     Type & Screen (If Applicable):  No results found for: LABABO, LABRH    Drug/Infectious Status (If Applicable):  No results found for: HIV, HEPCAB    COVID-19 Screening (If Applicable):   Lab Results   Component Value Date    COVID19 Not Detected 10/28/2020         Anesthesia Evaluation  Patient summary reviewed no history of anesthetic complications:   Airway: Mallampati: III  TM distance: >3 FB   Neck ROM: full  Mouth opening: > = 3 FB Dental:          Pulmonary:Negative Pulmonary ROS breath sounds clear to auscultation      (-) not a current smoker                          ROS comment: CTA chest 12/2013:  Impression-1. No pulmonary embolus. 2. No aneurysm formation or dissection of the thoracic aorta. 3. No acute pulmonary process.

## 2020-11-03 ENCOUNTER — ANESTHESIA (OUTPATIENT)
Dept: OPERATING ROOM | Age: 75
End: 2020-11-03
Payer: MEDICARE

## 2020-11-03 ENCOUNTER — HOSPITAL ENCOUNTER (OUTPATIENT)
Age: 75
Setting detail: OUTPATIENT SURGERY
Discharge: HOME | End: 2020-11-03
Attending: ORTHOPAEDIC SURGERY | Admitting: ORTHOPAEDIC SURGERY
Payer: MEDICARE

## 2020-11-03 VITALS
OXYGEN SATURATION: 95 % | WEIGHT: 212 LBS | HEIGHT: 63 IN | SYSTOLIC BLOOD PRESSURE: 146 MMHG | RESPIRATION RATE: 14 BRPM | DIASTOLIC BLOOD PRESSURE: 78 MMHG | HEART RATE: 76 BPM | TEMPERATURE: 98.6 F | BODY MASS INDEX: 37.56 KG/M2

## 2020-11-03 VITALS
SYSTOLIC BLOOD PRESSURE: 87 MMHG | DIASTOLIC BLOOD PRESSURE: 64 MMHG | OXYGEN SATURATION: 96 % | RESPIRATION RATE: 12 BRPM

## 2020-11-03 PROCEDURE — 2580000003 HC RX 258: Performed by: ANESTHESIOLOGY

## 2020-11-03 PROCEDURE — 2500000003 HC RX 250 WO HCPCS: Performed by: NURSE ANESTHETIST, CERTIFIED REGISTERED

## 2020-11-03 PROCEDURE — 3600000012 HC SURGERY LEVEL 2 ADDTL 15MIN: Performed by: ORTHOPAEDIC SURGERY

## 2020-11-03 PROCEDURE — 6370000000 HC RX 637 (ALT 250 FOR IP): Performed by: ANESTHESIOLOGY

## 2020-11-03 PROCEDURE — 7100000010 HC PHASE II RECOVERY - FIRST 15 MIN: Performed by: ORTHOPAEDIC SURGERY

## 2020-11-03 PROCEDURE — 3700000001 HC ADD 15 MINUTES (ANESTHESIA): Performed by: ORTHOPAEDIC SURGERY

## 2020-11-03 PROCEDURE — 2709999900 HC NON-CHARGEABLE SUPPLY: Performed by: ORTHOPAEDIC SURGERY

## 2020-11-03 PROCEDURE — 64721 CARPAL TUNNEL SURGERY: CPT | Performed by: ORTHOPAEDIC SURGERY

## 2020-11-03 PROCEDURE — 6360000002 HC RX W HCPCS: Performed by: NURSE PRACTITIONER

## 2020-11-03 PROCEDURE — 7100000011 HC PHASE II RECOVERY - ADDTL 15 MIN: Performed by: ORTHOPAEDIC SURGERY

## 2020-11-03 PROCEDURE — 6360000002 HC RX W HCPCS: Performed by: NURSE ANESTHETIST, CERTIFIED REGISTERED

## 2020-11-03 PROCEDURE — 3700000000 HC ANESTHESIA ATTENDED CARE: Performed by: ORTHOPAEDIC SURGERY

## 2020-11-03 PROCEDURE — 3600000002 HC SURGERY LEVEL 2 BASE: Performed by: ORTHOPAEDIC SURGERY

## 2020-11-03 RX ORDER — PROMETHAZINE HYDROCHLORIDE 25 MG/ML
25 INJECTION, SOLUTION INTRAMUSCULAR; INTRAVENOUS
Status: DISCONTINUED | OUTPATIENT
Start: 2020-11-03 | End: 2020-11-03 | Stop reason: HOSPADM

## 2020-11-03 RX ORDER — LABETALOL HYDROCHLORIDE 5 MG/ML
5 INJECTION, SOLUTION INTRAVENOUS EVERY 10 MIN PRN
Status: DISCONTINUED | OUTPATIENT
Start: 2020-11-03 | End: 2020-11-03 | Stop reason: HOSPADM

## 2020-11-03 RX ORDER — HYDROCODONE BITARTRATE AND ACETAMINOPHEN 5; 325 MG/1; MG/1
1 TABLET ORAL EVERY 6 HOURS PRN
Qty: 28 TABLET | Refills: 0 | Status: SHIPPED | OUTPATIENT
Start: 2020-11-03 | End: 2020-11-10

## 2020-11-03 RX ORDER — HYDRALAZINE HYDROCHLORIDE 20 MG/ML
5 INJECTION INTRAMUSCULAR; INTRAVENOUS EVERY 10 MIN PRN
Status: DISCONTINUED | OUTPATIENT
Start: 2020-11-03 | End: 2020-11-03 | Stop reason: HOSPADM

## 2020-11-03 RX ORDER — FENTANYL CITRATE 50 UG/ML
INJECTION, SOLUTION INTRAMUSCULAR; INTRAVENOUS PRN
Status: DISCONTINUED | OUTPATIENT
Start: 2020-11-03 | End: 2020-11-03 | Stop reason: SDUPTHER

## 2020-11-03 RX ORDER — FENTANYL CITRATE 50 UG/ML
50 INJECTION, SOLUTION INTRAMUSCULAR; INTRAVENOUS EVERY 5 MIN PRN
Status: DISCONTINUED | OUTPATIENT
Start: 2020-11-03 | End: 2020-11-03 | Stop reason: HOSPADM

## 2020-11-03 RX ORDER — PROPOFOL 10 MG/ML
INJECTION, EMULSION INTRAVENOUS CONTINUOUS PRN
Status: DISCONTINUED | OUTPATIENT
Start: 2020-11-03 | End: 2020-11-03 | Stop reason: SDUPTHER

## 2020-11-03 RX ORDER — MORPHINE SULFATE 2 MG/ML
1 INJECTION, SOLUTION INTRAMUSCULAR; INTRAVENOUS EVERY 5 MIN PRN
Status: DISCONTINUED | OUTPATIENT
Start: 2020-11-03 | End: 2020-11-03 | Stop reason: HOSPADM

## 2020-11-03 RX ORDER — CEFAZOLIN SODIUM 2 G/50ML
2 SOLUTION INTRAVENOUS ONCE
Status: COMPLETED | OUTPATIENT
Start: 2020-11-03 | End: 2020-11-03

## 2020-11-03 RX ORDER — SODIUM CHLORIDE, SODIUM LACTATE, POTASSIUM CHLORIDE, CALCIUM CHLORIDE 600; 310; 30; 20 MG/100ML; MG/100ML; MG/100ML; MG/100ML
INJECTION, SOLUTION INTRAVENOUS CONTINUOUS
Status: DISCONTINUED | OUTPATIENT
Start: 2020-11-03 | End: 2020-11-03 | Stop reason: HOSPADM

## 2020-11-03 RX ORDER — DIPHENHYDRAMINE HYDROCHLORIDE 50 MG/ML
12.5 INJECTION INTRAMUSCULAR; INTRAVENOUS
Status: DISCONTINUED | OUTPATIENT
Start: 2020-11-03 | End: 2020-11-03 | Stop reason: HOSPADM

## 2020-11-03 RX ORDER — HYDROCODONE BITARTRATE AND ACETAMINOPHEN 5; 325 MG/1; MG/1
2 TABLET ORAL PRN
Status: COMPLETED | OUTPATIENT
Start: 2020-11-03 | End: 2020-11-03

## 2020-11-03 RX ORDER — HYDROCODONE BITARTRATE AND ACETAMINOPHEN 5; 325 MG/1; MG/1
1 TABLET ORAL PRN
Status: COMPLETED | OUTPATIENT
Start: 2020-11-03 | End: 2020-11-03

## 2020-11-03 RX ORDER — OXYCODONE HYDROCHLORIDE AND ACETAMINOPHEN 5; 325 MG/1; MG/1
1 TABLET ORAL EVERY 4 HOURS PRN
Status: DISCONTINUED | OUTPATIENT
Start: 2020-11-03 | End: 2020-11-03 | Stop reason: HOSPADM

## 2020-11-03 RX ORDER — MEPERIDINE HYDROCHLORIDE 25 MG/ML
12.5 INJECTION INTRAMUSCULAR; INTRAVENOUS; SUBCUTANEOUS EVERY 5 MIN PRN
Status: DISCONTINUED | OUTPATIENT
Start: 2020-11-03 | End: 2020-11-03 | Stop reason: HOSPADM

## 2020-11-03 RX ORDER — LIDOCAINE HYDROCHLORIDE 5 MG/ML
INJECTION, SOLUTION INFILTRATION; INTRAVENOUS PRN
Status: DISCONTINUED | OUTPATIENT
Start: 2020-11-03 | End: 2020-11-03 | Stop reason: SDUPTHER

## 2020-11-03 RX ADMIN — CEFAZOLIN SODIUM 2 G: 2 SOLUTION INTRAVENOUS at 06:35

## 2020-11-03 RX ADMIN — PROPOFOL 75 MCG/KG/MIN: 10 INJECTION, EMULSION INTRAVENOUS at 06:38

## 2020-11-03 RX ADMIN — HYDROCODONE BITARTRATE AND ACETAMINOPHEN 1 TABLET: 5; 325 TABLET ORAL at 07:40

## 2020-11-03 RX ADMIN — LIDOCAINE HYDROCHLORIDE 50 ML: 5 INJECTION, SOLUTION INFILTRATION at 06:38

## 2020-11-03 RX ADMIN — SODIUM CHLORIDE, POTASSIUM CHLORIDE, SODIUM LACTATE AND CALCIUM CHLORIDE: 600; 310; 30; 20 INJECTION, SOLUTION INTRAVENOUS at 06:35

## 2020-11-03 RX ADMIN — FENTANYL CITRATE 50 MCG: 50 INJECTION, SOLUTION INTRAMUSCULAR; INTRAVENOUS at 06:37

## 2020-11-03 RX ADMIN — FENTANYL CITRATE 50 MCG: 50 INJECTION, SOLUTION INTRAMUSCULAR; INTRAVENOUS at 06:40

## 2020-11-03 RX ADMIN — SODIUM CHLORIDE, POTASSIUM CHLORIDE, SODIUM LACTATE AND CALCIUM CHLORIDE: 600; 310; 30; 20 INJECTION, SOLUTION INTRAVENOUS at 05:54

## 2020-11-03 ASSESSMENT — PULMONARY FUNCTION TESTS
PIF_VALUE: 1
PIF_VALUE: 0
PIF_VALUE: 1
PIF_VALUE: 0
PIF_VALUE: 1
PIF_VALUE: 0
PIF_VALUE: 1
PIF_VALUE: 0
PIF_VALUE: 1
PIF_VALUE: 0
PIF_VALUE: 1
PIF_VALUE: 0
PIF_VALUE: 1
PIF_VALUE: 1
PIF_VALUE: 0
PIF_VALUE: 0
PIF_VALUE: 1
PIF_VALUE: 0

## 2020-11-03 ASSESSMENT — PAIN SCALES - GENERAL
PAINLEVEL_OUTOF10: 0
PAINLEVEL_OUTOF10: 0
PAINLEVEL_OUTOF10: 4

## 2020-11-03 ASSESSMENT — PAIN - FUNCTIONAL ASSESSMENT: PAIN_FUNCTIONAL_ASSESSMENT: 0-10

## 2020-11-03 ASSESSMENT — LIFESTYLE VARIABLES: SMOKING_STATUS: 0

## 2020-11-03 NOTE — OP NOTE
Operative Note      Patient: Trinda Moritz  YOB: 1945  MRN: [id number]    Date of Procedure: 11/3/2020    Pre-Op Diagnosis: RIGHT CARPAL TUNNEL    Post-Op Diagnosis: Same       Procedure(s):  RIGHT WRIST CARPAL TUNNEL RELEASE    Surgeon(s): George Abel DO    Assistant:   * No surgical staff found *    Anesthesia: [place] Block    Estimated Blood Loss (mL): Minimal    Complications: None    Specimens:   * No specimens in log *    Implants:  * No implants in log *      Drains: * No LDAs found *    Findings: as above    Detailed Description of Procedure:   Below    SURGEON: CHIP DE LA CRUZ D.O.   ASSISTANT: none  PREOPERATIVE DIAGNOSIS: Right wrist carpal tunnel syndrome. POSTOPERATIVE DIAGNOSIS: Right wrist carpal tunnel syndrome. PROCEDURE: Release transverse carpal ligament, Right wrist.   ANESTHESIA: bb  ESTIMATED BLOOD LOSS: minimal in degree  COMPLICATIONS: None. Brief Hospital Course: The  patients well  known to Rui Jacinto DO's practice with persistent complaints of right wrist/hand pain and numbness. Wrsit and hand pain has failed to be relieved by non-operative conservative measures, and has began affecting daily activities of living. After examination of the patient, review of the EMG, radiologic studies, and appropriate pre-operative risk assessment, Rui Jacinto DO recommended right carpal tunnel release,  which the patient was agreeable towards. OPERATIVE PROCEDURE: The patient was brought to the operating suite and was   given anesthesia. The right arm was identified with a   preoperative time-out, the arm was prepped and draped in sterile fashion, I  outlined incision along the volar side of the wrist just ulnar to the thenar   wrist crease. I made an approximately 2 to 4-cm incision over this area through the skin   and subcutaneous tissue. I dissected that down to the level of the palmar   fascia. It was identified and I released it sharply.  I Identified the transverse carpal ligament and incised this with a 15-blade. Using tenotomy   scissors, I finished the release of the ligament proximally and distally to its full extent. I thoroughly irrigated the wound upon closure. I closed the incision with 4-0 Prolene in a horizontal mattress type fashion. Sterile dressing was placed on the wound. The patient recovered to the   recovery room without difficulty.        Electronically signed by Inga Anderson DO on 11/3/2020 at 6:38 AM

## 2020-11-03 NOTE — ANESTHESIA PROCEDURE NOTES
Peripheral Block    Patient location during procedure: OR  Staffing  Anesthesiologist: Diego Beth MD  Resident/CRNA: KELLEY Robles - CRNA  Performed: resident/CRNA   Preanesthetic Checklist  Completed: patient identified, site marked, surgical consent, pre-op evaluation, timeout performed, IV checked, risks and benefits discussed, monitors and equipment checked, anesthesia consent given, oxygen available and patient being monitored  Peripheral Block  Patient position: supine  Prep: alcohol swabs  Patient monitoring: cardiac monitor, continuous pulse ox, continuous capnometry, frequent blood pressure checks and IV access  Block type: [place] block  Laterality: right  Injection technique: single-shot  Procedures: other  Provider prep: mask  Assessment  Hemodynamics: stable  Reason for block: primary anesthetic

## 2020-11-03 NOTE — H&P
Updated H&P    . Chief Complaint   Patient presents with    Carpal Tunnel       Bilateral carpal tunnel with right worse then left. C/o tingling numbing and pain. She has tried wrist bracews without much relief. Symptoms have been going on for a couple years. Patient also had injections in the carpal tunnel which hlelped for a couple weeks.         Raghu Valentin is a 69y.o. year old  female who presents for evaluation of bilateral wrist pain. she reports this started several months ago. she does not remember a specific injury that started the pain. .  The injury was none. The pain is located mainly over 1-3 . The pain is worse with numbness and better with rest.  The patient has tried csi and bracing. The treatment has not been effective.   The patient is right dominant.     Past Medical History        Past Medical History:   Diagnosis Date    Hypertension      Mitral valve regurgitation      Psychiatric problem       depression        Past Surgical History         Past Surgical History:   Procedure Laterality Date    BLADDER REPAIR        CATARACT REMOVAL WITH IMPLANT         bilateral    CERVICAL SPINE SURGERY         6th and 7th fusion    CHOLECYSTECTOMY        ECHO COMPL W DOP COLOR FLOW   12/18/2013          HERNIA REPAIR        HYSTERECTOMY        NERVE BLOCK Left 6 9 16     sacroiliac joint #1        Current Medication     Current Outpatient Medications:     calcium carbonate (OSCAL) 500 MG TABS tablet, Take 500 mg by mouth daily, Disp: , Rfl:     atorvastatin (LIPITOR) 40 MG tablet, Take 1 tablet by mouth daily, Disp: 90 tablet, Rfl: 1    spironolactone (ALDACTONE) 25 MG tablet, TAKE ONE TABLET BY MOUTH DAILY, Disp: 90 tablet, Rfl: 0    atenolol (TENORMIN) 25 MG tablet, TAKE ONE TABLET BY MOUTH EVERY DAY, Disp: 90 tablet, Rfl: 1    citalopram (CELEXA) 20 MG tablet, Indications: 1 and 1/2 tab daily Pt is to take 1 and 1/2 tabs daily (30mg), Disp: 135 tablet, Rfl: 2    ramipril (ALTACE) 2.5 MG capsule, Take 1 capsule by mouth daily, Disp: 90 capsule, Rfl: 2    meloxicam (MOBIC) 7.5 MG tablet, Take 1 tablet by mouth daily as needed for Pain, Disp: 90 tablet, Rfl: 1    Biotin 66914 MCG TABS, Take 10,000 mcg by mouth daily, Disp: , Rfl:     Glucosamine-Chondroit-Vit C-Mn (GLUCOSAMINE 1500 COMPLEX) CAPS, Take 1 capsule by mouth, Disp: , Rfl:      No Known Allergies  Social History               Socioeconomic History    Marital status:        Spouse name: Not on file    Number of children: 3    Years of education: 15    Highest education level: Not on file   Occupational History    Not on file   Social Needs    Financial resource strain: Not hard at all   [employer] insecurity       Worry: Never true       Inability: Never true    Transportation needs       Medical: No       Non-medical: No   Tobacco Use    Smoking status: Never Smoker    Smokeless tobacco: Never Used   Substance and Sexual Activity    Alcohol use: No    Drug use: No    Sexual activity: Not Currently       Partners: Male   Lifestyle    Physical activity       Days per week: 5 days       Minutes per session: 30 min    Stress: Not at all   Relationships    Social connections       Talks on phone: Twice a week       Gets together: More than three times a week       Attends Hindu service: More than 4 times per year       Active member of club or organization: No       Attends meetings of clubs or organizations: Never       Relationship status:     Intimate partner violence       Fear of current or ex partner: No       Emotionally abused: No       Physically abused:  No       Forced sexual activity: No   Other Topics Concern    Not on file   Social History Narrative    Not on file        Family History         Family History   Problem Relation Age of Onset    Other Mother           CHF  at 80    Cancer Father            of pancreatic cancer at 67    Diabetes Sister      Other Sister         Blood clots    Stroke Sister      Stroke Brother            of massive stroke at 62    No Known Problems Brother             REVIEW OF SYSTEMS:      General/Constitution:  (-)weight loss, (-)fever, (-)chills, (-)weakness. Skin: (-) rash,(-) psoriasis,(-) eczema, (-)skin cancer. Musculoskeletal: (-) fractures,  (-) dislocations,(-) collagen vascular disease, (-) fibromyalgia, (-) multiple sclerosis, (-) muscular dystrophy, (-) RSD,(-) joint pain (-)swelling, (-) joint pain,swelling. Neurologic: (-) epilepsy, (-)seizures,(-) brain tumor,(-) TIA, (-)stroke, (-)headaches, (-)Parkinson disease,(-) memory loss, (-) LOC. Cardiovascular: (-) Chest pain, (-) swelling in legs/feet, (-) SOB, (-) cramping in legs/feet with walking. Respiratory: (-) SOB, (-) Coughing, (-) night sweats. GI: (-) nausea, (-) vomiting, (-) diarrhea, (-) blood in stool, (-) gastric ulcer. Psychiatric: (-) Depression, (-) Anxiety, (-) bipolar disease, (-) Alzheimer's Disease  Allergic/Immunologic: (-) allergies latex, (-) allergies metal, (-) skin sensitivity.   Hematlogic: (-) anemia, (-) blood transfusion, (-) DVT/PE, (-) Clotting disorders        Subjective:     Vital signs are stable.  In general, patient is awake, alert and oriented X3, in no apparent distress.  Examination of HENT reveals normocephalic, atraumatic.  PERRLA/EOMI sclera are white.  Conjunctivae are clear.  TM's are intact.  Pharynx is pink and moist.  Uvula and tongue are midline.  Heart: Positive S1 and positive S2 with regular rate and rhythm.  Lungs: Clear to auscultation bilaterally without rales, rhonchi or wheezes.  Abdomen: soft, nontender.  Positive bowel sounds.  No organomegaly.  No guarding or rigidity.        Constitution:  BP (!) 141/71   Pulse 78   Temp 96 °F (35.6 °C) (Temporal)   Resp 20   Ht 5' 3\" (1.6 m)   Wt 212 lb (96.2 kg)   SpO2 95%   BMI 37.55 kg/m²        Psycihatric:  The patient is alert and oriented x 3, appears to be stated age and in no distress.       Respiratory:  Respiratory effort is not labored. Patient is not gasping. Palpation of the chest reveals no tactile fremitus.     Skin:  Upon inspection: the skin appears warm, dry and intact. There is not a previous scar over the affected area. There is not any cellulitis, lymphedema or cutaneous lesions noted in the lower extremities. Upon palpation there is no induration noted.       Neurologic:  Motor exam of the upper extremities show: The reflexes in biceps/triceps/brachioradialis are equal and symmetric. Sensory exam C5-T1 are normal bilaterally. Cardiovascular: The vascular exam is normal and is well perfused to distal extremities. There are 2+ radial pulses bilaterally, and motor and sensation is intact to ulnar, and radial, musclocutaneus, and axillary nerve distribution and grossly symmetric bilaterally. There is cap refill noted less than two seconds in all digits. There is not edema of the bilateral upper extremities. There is not varicosities noted in the distal extremities. Median nerve distribution altered     Lymph:  Upon palpation,  there is no lymphadenopathy noted in bilateral upper extremities.       Musculoskeletal:  Gait: normal; examination of the nails and digits reveal no cyanosis or clubbing.     Cervical Exam:  On physical exam, Bonita Thornton is well-developed, well-nourished, oriented to person, place and time. her gait is normal.  On evaluation of her cervical spine, she has full range of motion of the cervical spine without pain. There is no cervical tenderness to palpation.      Shoulder Exam:  On evaluation of her bilaterally upper extremities, her bilateral shoulder has no deformity. There is not evidence of scapular dyskinesis. There is not muscle atrophy in shoulder girdle. The range of motion for the Right Shoulder is 150/50/t8 and for the Left shoulder is 150/50/t8. Right shoulder Motor strength is 5/5 in the supraspinatus, 5/5 internal rotation and 5/5 in external rotation, and Left shoulder motor strength 5/5 in supraspinatus, 5/5 in internal rotation, 5/5 in external rotation. Elbow exam:  Evaluation of the elbow, reveals no signs of swelling or deformity. ROM is 0-140. There is not instability with varus/valgus stresses. Motor strength is 5/5 with flexion/extension.      Wrist exam:  Inspection of the bilateral upper extremities, there is no evidence of deformity of the wrist.  ROM Wrist ROM R wrist DF 70, VF 70, L wrist DF 70, VF 70, R pronation 90/ supination 90, L pronation 90/supination 90. Motor strength is 5/5 with Dorsiflexion/Volarflexion/Supination/Pronation. Motor and sensation is intact and symmetric throughout the bilateral upper extremities in the median, ulnar and radial , musclcutaneous, and axillary nerve distributions.     Hand exam:  The skin overlying the hand is  intact. There is not evidence of scar, lesion, laceration, or abrasion. The motion in the small joints of the hand are intact with no stiffness or deformity. The ROM in the MCP flexion 90/ extension 0 , PIP flexion 90/ extension 0, DIP flexion 70/ extension 0. There is not rotational deformity. There is no masses or adenopathy in bilateral upper extremities. Radial pulses are 2+ and symmetric bilaterally. Capillary refill is intact and < 2 seconds. Motor strength is 5/5 with flexion and extension of the small finger joints.      Right:  Phallens sign(+), Tinnells sign (+), Median nerve compression test (+),  Finklesteins (-), CMC Grind test (-), [employer](-).    Left:     Phallens sign(+), Tinnells sign (+), Median nerve compression test (+),  Finklesteins (-), CMC Grind test (-), [employer](-).      EMG:  Diagnostic Interpretation:     This study was abnormal.      1. There is electrodiagnostic evidence for bilateral (right worse than left) sensorimotor median mononeuropathy at or about the wrist, primarily demyelinating in nature on left, mixed on the right, moderate in severity, without evidence of active denervation. It is chronic in duration. This is consistent with the clinical diagnosis of carpal tunnel syndrome. Prognosis is good if cause is alleviated.      2. There is no electrodiagnostic evidence for C5-T1 motor radiculopathy in bilateral upper extremities.      Previous Study: None       Follow up EMG is recommended if clinically indicated.      Discussed treatment options including conservative vs surgical treatment. She wants to try braces and injections. Will schedule.      Radiographic findings reviewed with patient     Impression:        Encounter Diagnoses   Name Primary?  Right carpal tunnel syndrome Yes    Left carpal tunnel syndrome           Plan: Natural history and expected course discussed. Questions answered. Educational materials distributed. Rest, ice, compression, and elevation (RICE) therapy. Reduction in offending activity discussed. I had a lengthy discussion with the patient regarding their diagnosis. I explained treatment options including surgical vs non surgical treatment. I reviewed in detail the risks and benefits and outlined the procedure in detail with expected outcomes and possible complications. I also discussed non surgical treatment such as injections (CSI), physical therapy, topical creams and NSAID's. They have elected for surgical management at this time. We will perform a Right carpal tunnel release 11/3/2020. The risks and benefits were reviewed with the patient such as:  DVT, infection,  injuries to blood vessels and nerves, non relief of symptoms, continued pain, worsening of symptoms.      The patient was counseled at length about the risks of sujit Covid-19 during their perioperative period and any recovery window from their procedure.  The patient was made aware that sujit Covid-19  may worsen their prognosis for recovering from their procedure  and lend

## 2020-11-03 NOTE — ANESTHESIA POSTPROCEDURE EVALUATION
Department of Anesthesiology  Postprocedure Note    Patient: Ade Mcguire  MRN: [id number]  YOB: 1945  Date of evaluation: 11/3/2020  Time:  8:35 AM     Procedure Summary     Date:  11/03/20 Room / Location:  [address] [place] [address] 01 / 4[address] [facility], operated by [facility]    Anesthesia Start:  6018 Anesthesia Stop:  9355    Procedure:  RIGHT WRIST CARPAL TUNNEL RELEASE (Right ) Diagnosis:  (RIGHT CARPAL TUNNEL)    Surgeon:  Tracy Arredondo DO Responsible Provider:  John Winter MD    Anesthesia Type:  MAC ASA Status:  3          Anesthesia Type: MAC    Russell Phase I: Russell Score: 10    Russell Phase II: Russell Score: 10    Last vitals: Reviewed and per EMR flowsheets.        Anesthesia Post Evaluation    Patient location during evaluation: PACU  Patient participation: complete - patient participated  Level of consciousness: awake and alert  Airway patency: patent  Nausea & Vomiting: no nausea and no vomiting  Complications: no  Cardiovascular status: blood pressure returned to baseline  Respiratory status: room air and spontaneous ventilation

## 2020-11-18 ENCOUNTER — OFFICE VISIT (OUTPATIENT)
Dept: ORTHOPEDIC SURGERY | Age: 75
End: 2020-11-18

## 2020-11-18 VITALS — WEIGHT: 212 LBS | HEIGHT: 63 IN | BODY MASS INDEX: 37.56 KG/M2

## 2020-11-18 PROCEDURE — 99024 POSTOP FOLLOW-UP VISIT: CPT | Performed by: NURSE PRACTITIONER

## 2020-11-18 NOTE — PROGRESS NOTES
Subjective:     Osiris Guillen is here for followup after right carpal tunnel surgery. The patient is not having any pain. The patient notes improvement in the following symptoms: strength, numbness, pain, sensation. The patient denies fever, wound drainage, increasing redness, pus, increasing pain, increasing swelling. Post op problems reported: none. Objective:       General :    alert, appears stated age and cooperative   Sutures:   Sutures in place and will be removed today. Incision:  healing well, no significant drainage, no dehiscence, no significant erythema   Tenderness:  none   Flexion ROM:  full range of motion   Extension ROM:  full range of motion   Effusion:  none        Assessment:     Encounter Diagnosis   Name Primary?  Right carpal tunnel syndrome Yes     Plan:   Sutures removed today. Range of motion and rehabilitation exercises discussed with the patient. HEP  Follow up in 4 weeks.   Call with any questions or concerns at [phone]

## 2020-11-18 NOTE — PATIENT INSTRUCTIONS
Patient Education        Carpal Tunnel Syndrome: Exercises  Introduction  Here are some examples of exercises for you to try. The exercises may be suggested for a condition or for rehabilitation. Start each exercise slowly. Ease off the exercises if you start to have pain. You will be told when to start these exercises and which ones will work best for you. Warm-up stretches  When you no longer have pain or numbness, you can do exercises to help prevent carpal tunnel syndrome from coming back. Do not do any stretch or movement that is uncomfortable or painful. 1. Rotate your wrist up, down, and from side to side. Repeat 4 times. 2. Stretch your fingers far apart. Relax them, and then stretch them again. Repeat 4 times. 3. Stretch your thumb by pulling it back gently, holding it, and then releasing it. Repeat 4 times. How to do the exercises  Prayer stretch   1. Start with your palms together in front of your chest just below your chin. 2. Slowly lower your hands toward your waistline, keeping your hands close to your stomach and your palms together until you feel a mild to moderate stretch under your forearms. 3. Hold for at least 15 to 30 seconds. Repeat 2 to 4 times. Wrist flexor stretch   1. Extend your arm in front of you with your palm up. 2. Bend your wrist, pointing your hand toward the floor. 3. With your other hand, gently bend your wrist farther until you feel a mild to moderate stretch in your forearm. 4. Hold for at least 15 to 30 seconds. Repeat 2 to 4 times. Wrist extensor stretch   1. Repeat steps 1 through 4 of the stretch above, but begin with your extended hand palm down. Follow-up care is a key part of your treatment and safety. Be sure to make and go to all appointments, and call your doctor if you are having problems. It's also a good idea to know your test results and keep a list of the medicines you take. Where can you learn more?   Go to [email or web address] and sign in to your [email or web address] account. Enter R902 in the [employer] box to learn more about \"Carpal Tunnel Syndrome: Exercises. \"     If you do not have an account, please click on the \"Sign Up Now\" link. Current as of: March 2, 2020               Content Version: 12.6  © 3098-5960 [employer]. Care instructions adapted under license by [facility] ([facility] [place]). If you have questions about a medical condition or this instruction, always ask your healthcare professional. [address] any warranty or liability for your use of this information.

## 2021-07-07 DIAGNOSIS — F33.41 RECURRENT MAJOR DEPRESSIVE DISORDER, IN PARTIAL REMISSION (HCC): ICD-10-CM

## 2021-07-07 DIAGNOSIS — I10 ESSENTIAL HYPERTENSION: ICD-10-CM

## 2021-07-07 DIAGNOSIS — G89.29 CHRONIC PAIN OF BOTH ANKLES: ICD-10-CM

## 2021-07-07 DIAGNOSIS — M25.571 CHRONIC PAIN OF BOTH ANKLES: ICD-10-CM

## 2021-07-07 DIAGNOSIS — E78.5 HYPERLIPIDEMIA, UNSPECIFIED HYPERLIPIDEMIA TYPE: ICD-10-CM

## 2021-07-07 DIAGNOSIS — M25.572 CHRONIC PAIN OF BOTH ANKLES: ICD-10-CM

## 2021-07-07 LAB
ALBUMIN SERPL-MCNC: 3.9 G/DL (ref 3.5–5.2)
ALP BLD-CCNC: 107 U/L (ref 35–104)
ALT SERPL-CCNC: 17 U/L (ref 0–32)
ANION GAP SERPL CALCULATED.3IONS-SCNC: 14 MMOL/L (ref 7–16)
AST SERPL-CCNC: 19 U/L (ref 0–31)
BACTERIA: ABNORMAL /HPF
BASOPHILS ABSOLUTE: 0.05 E9/L (ref 0–0.2)
BASOPHILS RELATIVE PERCENT: 0.5 % (ref 0–2)
BILIRUB SERPL-MCNC: 0.5 MG/DL (ref 0–1.2)
BUN BLDV-MCNC: 21 MG/DL (ref 6–23)
CALCIUM SERPL-MCNC: 9.7 MG/DL (ref 8.6–10.2)
CHLORIDE BLD-SCNC: 100 MMOL/L (ref 98–107)
CHOLESTEROL, TOTAL: 148 MG/DL (ref 0–199)
CO2: 23 MMOL/L (ref 22–29)
CREAT SERPL-MCNC: 1.1 MG/DL (ref 0.5–1)
EOSINOPHILS ABSOLUTE: 0.17 E9/L (ref 0.05–0.5)
EOSINOPHILS RELATIVE PERCENT: 1.8 % (ref 0–6)
EPITHELIAL CELLS, UA: ABNORMAL /HPF
GFR AFRICAN AMERICAN: 59
GFR NON-AFRICAN AMERICAN: 49 ML/MIN/1.73
GLUCOSE BLD-MCNC: 89 MG/DL (ref 74–99)
HBA1C MFR BLD: 5.5 % (ref 4–5.6)
HCT VFR BLD CALC: 44.5 % (ref 34–48)
HDLC SERPL-MCNC: 50 MG/DL
HEMOGLOBIN: 14.2 G/DL (ref 11.5–15.5)
IMMATURE GRANULOCYTES #: 0.04 E9/L
IMMATURE GRANULOCYTES %: 0.4 % (ref 0–5)
LDL CHOLESTEROL CALCULATED: 75 MG/DL (ref 0–99)
LYMPHOCYTES ABSOLUTE: 2.95 E9/L (ref 1.5–4)
LYMPHOCYTES RELATIVE PERCENT: 31.5 % (ref 20–42)
MAGNESIUM: 1.9 MG/DL (ref 1.6–2.6)
MCH RBC QN AUTO: 30.3 PG (ref 26–35)
MCHC RBC AUTO-ENTMCNC: 31.9 % (ref 32–34.5)
MCV RBC AUTO: 95.1 FL (ref 80–99.9)
MONOCYTES ABSOLUTE: 0.64 E9/L (ref 0.1–0.95)
MONOCYTES RELATIVE PERCENT: 6.8 % (ref 2–12)
NEUTROPHILS ABSOLUTE: 5.51 E9/L (ref 1.8–7.3)
NEUTROPHILS RELATIVE PERCENT: 59 % (ref 43–80)
PDW BLD-RTO: 14.9 FL (ref 11.5–15)
PHOSPHORUS: 3.2 MG/DL (ref 2.5–4.5)
PLATELET # BLD: 261 E9/L (ref 130–450)
PMV BLD AUTO: 11.9 FL (ref 7–12)
POTASSIUM SERPL-SCNC: 4.7 MMOL/L (ref 3.5–5)
RBC # BLD: 4.68 E12/L (ref 3.5–5.5)
RBC UA: ABNORMAL /HPF (ref 0–2)
SEDIMENTATION RATE, ERYTHROCYTE: 46 MM/HR (ref 0–20)
SODIUM BLD-SCNC: 137 MMOL/L (ref 132–146)
TOTAL PROTEIN: 7.9 G/DL (ref 6.4–8.3)
TRIGL SERPL-MCNC: 117 MG/DL (ref 0–149)
TSH SERPL DL<=0.05 MIU/L-ACNC: 1.48 UIU/ML (ref 0.27–4.2)
VITAMIN D 25-HYDROXY: 45 NG/ML (ref 30–100)
VLDLC SERPL CALC-MCNC: 23 MG/DL
WBC # BLD: 9.4 E9/L (ref 4.5–11.5)
WBC UA: ABNORMAL /HPF (ref 0–5)

## 2021-07-19 ENCOUNTER — HOSPITAL ENCOUNTER (OUTPATIENT)
Dept: INTERVENTIONAL RADIOLOGY/VASCULAR | Age: 76
Discharge: HOME | End: 2021-07-21
Payer: MEDICARE

## 2021-07-19 DIAGNOSIS — R42 DIZZINESS: ICD-10-CM

## 2021-07-19 PROCEDURE — 93880 EXTRACRANIAL BILAT STUDY: CPT

## 2021-08-03 PROBLEM — R19.7 DIARRHEA: Status: RESOLVED | Noted: 2018-09-26 | Resolved: 2021-08-03

## 2021-08-03 PROBLEM — H81.13 BPV (BENIGN POSITIONAL VERTIGO), BILATERAL: Status: ACTIVE | Noted: 2021-08-03

## 2021-08-03 PROBLEM — F32.9 REACTIVE DEPRESSION: Status: RESOLVED | Noted: 2018-09-26 | Resolved: 2021-08-03

## 2021-09-28 ENCOUNTER — TELEPHONE (OUTPATIENT)
Dept: PHARMACY | Facility: CLINIC | Age: 76
End: 2021-09-28

## 2021-09-28 NOTE — TELEPHONE ENCOUNTER
Patient returned call regarding adherence message below. Verified patient is no longer on Ramipiril and no longer on med list.  Removed TIP from Outcomes. Patient thanked us for checking on medications and declined any further medication assistance.

## 2021-09-28 NOTE — TELEPHONE ENCOUNTER
[facility] CLINICAL PHARMACY REVIEW: ADHERENCE REVIEW  Identified care gap per [employer]; fills at [facility]: ACE/ARB and Statin adherence    Last Visit: 8/3/21      Patient found in Outcomes MTM and is currently eligible for TIP    ASSESSMENT  ACE/ARB ADHERENCE    Per Insurance Records through 9/5/21 (2020 [facility] = 100%; YTD [employer] = 82%; Potential Fail Date: 10/4/21):   RAMIPRIL     CAP 2.5MG last filled on 4/28/21 for 90 day supply. Next refill due: 7/30/21    Per Reconciled Dispense Report:  RAMIPRIL     CAP 2.5MG last filled on 4/28/21 for 90 day supply. Per [facility]:   RAMIPRIL     CAP 2.5MG not in [facility] system  Per med list in patients chart medication was taken off on 3/25/20    BP Readings from Last 3 Encounters:   08/03/21 122/62   07/07/21 (!) 108/54   01/06/21 116/68     Estimated Creatinine Clearance: 50 mL/min (A) (based on SCr of 1.1 mg/dL (H)). [facility]    [facility]/21 [facility] YTD [facility] = 99%; Potential Fail Date: 11/16/21):   ATORVASTATIN TAB 40MG last filled on 6/25/21 for 90 day supply. Next refill due: 9/23/21    Per Reconciled Dispense Report:  ATORVASTATIN TAB 40MG last filled on 6/25/21 for 90 day supply. Per [employer]  ATORVASTATIN TAB 40MG picked up on 9/21/21 for 90 day supply at [facility]     Per [address]:   ATORVASTATIN TAB 40MG last picked up on 9/23/21 for 90 day supply. 1 refills remaining.  Billed through [employer]   Component Value Date    CHOL 148 07/07/2021    TRIG 117 07/07/2021    HDL 50 07/07/2021    LDLCALC 75 07/07/2021     ALT   Date Value Ref Range Status   07/07/2021 17 0 - 32 U/L Final     AST   Date Value Ref Range Status   07/07/2021 19 0 - 31 U/L Final     The 10-year ASCVD risk score (Guy Wiley, et al., 2013) is: 15.1%    Values used to calculate the score:      Age: 70 years      Sex: Female      Is Non- : No      Diabetic: No      Tobacco smoker:

## 2022-02-23 DIAGNOSIS — I10 ESSENTIAL HYPERTENSION: ICD-10-CM

## 2022-02-23 DIAGNOSIS — N18.30 CHRONIC KIDNEY DISEASE (CKD), ACTIVE MEDICAL MANAGEMENT WITHOUT DIALYSIS, STAGE 3 (MODERATE) (HCC): ICD-10-CM

## 2022-02-23 PROBLEM — F33.41 RECURRENT MAJOR DEPRESSIVE DISORDER, IN PARTIAL REMISSION (HCC): Status: RESOLVED | Noted: 2019-01-02 | Resolved: 2022-02-23

## 2022-02-24 LAB
ALBUMIN SERPL-MCNC: 4.3 G/DL (ref 3.5–5.2)
ALP BLD-CCNC: 124 U/L (ref 35–104)
ALT SERPL-CCNC: 23 U/L (ref 0–32)
ANION GAP SERPL CALCULATED.3IONS-SCNC: 16 MMOL/L (ref 7–16)
AST SERPL-CCNC: 23 U/L (ref 0–31)
BILIRUB SERPL-MCNC: 0.7 MG/DL (ref 0–1.2)
BUN BLDV-MCNC: 22 MG/DL (ref 6–23)
CALCIUM SERPL-MCNC: 10.3 MG/DL (ref 8.6–10.2)
CHLORIDE BLD-SCNC: 101 MMOL/L (ref 98–107)
CO2: 22 MMOL/L (ref 22–29)
CREAT SERPL-MCNC: 1.3 MG/DL (ref 0.5–1)
GFR AFRICAN AMERICAN: 48
GFR NON-AFRICAN AMERICAN: 40 ML/MIN/1.73
GLUCOSE BLD-MCNC: 98 MG/DL (ref 74–99)
POTASSIUM SERPL-SCNC: 4.9 MMOL/L (ref 3.5–5)
SODIUM BLD-SCNC: 139 MMOL/L (ref 132–146)
TOTAL PROTEIN: 8.9 G/DL (ref 6.4–8.3)

## 2022-05-26 DIAGNOSIS — E78.5 DYSLIPIDEMIA: ICD-10-CM

## 2022-05-26 DIAGNOSIS — R42 DIZZINESS: ICD-10-CM

## 2022-05-26 DIAGNOSIS — N18.30 CHRONIC KIDNEY DISEASE (CKD), ACTIVE MEDICAL MANAGEMENT WITHOUT DIALYSIS, STAGE 3 (MODERATE) (HCC): ICD-10-CM

## 2022-05-26 DIAGNOSIS — I10 ESSENTIAL HYPERTENSION: ICD-10-CM

## 2022-05-26 DIAGNOSIS — F32.9 REACTIVE DEPRESSION: ICD-10-CM

## 2022-05-26 DIAGNOSIS — R79.9 ABNORMAL FINDING OF BLOOD CHEMISTRY, UNSPECIFIED: ICD-10-CM

## 2022-05-26 LAB
ALBUMIN SERPL-MCNC: 4.1 G/DL (ref 3.5–5.2)
ALP BLD-CCNC: 136 U/L (ref 35–104)
ALT SERPL-CCNC: 24 U/L (ref 0–32)
ANION GAP SERPL CALCULATED.3IONS-SCNC: 12 MMOL/L (ref 7–16)
AST SERPL-CCNC: 24 U/L (ref 0–31)
BACTERIA: ABNORMAL /HPF
BASOPHILS ABSOLUTE: 0.06 E9/L (ref 0–0.2)
BASOPHILS RELATIVE PERCENT: 0.6 % (ref 0–2)
BILIRUB SERPL-MCNC: 0.5 MG/DL (ref 0–1.2)
BILIRUBIN URINE: NEGATIVE
BLOOD, URINE: NEGATIVE
BUN BLDV-MCNC: 22 MG/DL (ref 6–23)
CALCIUM SERPL-MCNC: 9.6 MG/DL (ref 8.6–10.2)
CHLORIDE BLD-SCNC: 106 MMOL/L (ref 98–107)
CHOLESTEROL, TOTAL: 140 MG/DL (ref 0–199)
CLARITY: ABNORMAL
CO2: 23 MMOL/L (ref 22–29)
COLOR: YELLOW
CREAT SERPL-MCNC: 1.1 MG/DL (ref 0.5–1)
CREATININE URINE: 118 MG/DL (ref 29–226)
EOSINOPHILS ABSOLUTE: 0.48 E9/L (ref 0.05–0.5)
EOSINOPHILS RELATIVE PERCENT: 4.9 % (ref 0–6)
GFR AFRICAN AMERICAN: 59
GFR NON-AFRICAN AMERICAN: 48 ML/MIN/1.73
GLUCOSE BLD-MCNC: 98 MG/DL (ref 74–99)
GLUCOSE URINE: NEGATIVE MG/DL
HBA1C MFR BLD: 5.9 % (ref 4–5.6)
HCT VFR BLD CALC: 47.1 % (ref 34–48)
HDLC SERPL-MCNC: 52 MG/DL
HEMOGLOBIN: 15 G/DL (ref 11.5–15.5)
IMMATURE GRANULOCYTES #: 0.04 E9/L
IMMATURE GRANULOCYTES %: 0.4 % (ref 0–5)
KETONES, URINE: NEGATIVE MG/DL
LDL CHOLESTEROL CALCULATED: 71 MG/DL (ref 0–99)
LEUKOCYTE ESTERASE, URINE: ABNORMAL
LYMPHOCYTES ABSOLUTE: 3.14 E9/L (ref 1.5–4)
LYMPHOCYTES RELATIVE PERCENT: 32.1 % (ref 20–42)
MAGNESIUM: 1.7 MG/DL (ref 1.6–2.6)
MCH RBC QN AUTO: 29.4 PG (ref 26–35)
MCHC RBC AUTO-ENTMCNC: 31.8 % (ref 32–34.5)
MCV RBC AUTO: 92.4 FL (ref 80–99.9)
MONOCYTES ABSOLUTE: 0.59 E9/L (ref 0.1–0.95)
MONOCYTES RELATIVE PERCENT: 6 % (ref 2–12)
NEUTROPHILS ABSOLUTE: 5.46 E9/L (ref 1.8–7.3)
NEUTROPHILS RELATIVE PERCENT: 56 % (ref 43–80)
NITRITE, URINE: POSITIVE
PDW BLD-RTO: 15.4 FL (ref 11.5–15)
PH UA: 5 (ref 5–9)
PHOSPHORUS: 3 MG/DL (ref 2.5–4.5)
PLATELET # BLD: 306 E9/L (ref 130–450)
PMV BLD AUTO: 11.5 FL (ref 7–12)
POTASSIUM SERPL-SCNC: 4.7 MMOL/L (ref 3.5–5)
PROTEIN PROTEIN: 9 MG/DL (ref 0–12)
PROTEIN UA: NEGATIVE MG/DL
PROTEIN/CREAT RATIO: 0.1
PROTEIN/CREAT RATIO: 0.1 (ref 0–0.2)
RBC # BLD: 5.1 E12/L (ref 3.5–5.5)
RBC UA: ABNORMAL /HPF (ref 0–2)
SODIUM BLD-SCNC: 141 MMOL/L (ref 132–146)
SPECIFIC GRAVITY UA: 1.02 (ref 1–1.03)
TOTAL PROTEIN: 8.1 G/DL (ref 6.4–8.3)
TRIGL SERPL-MCNC: 86 MG/DL (ref 0–149)
UROBILINOGEN, URINE: 0.2 E.U./DL
VITAMIN D 25-HYDROXY: 38 NG/ML (ref 30–100)
VLDLC SERPL CALC-MCNC: 17 MG/DL
WBC # BLD: 9.8 E9/L (ref 4.5–11.5)
WBC UA: ABNORMAL /HPF (ref 0–5)

## 2022-05-27 PROBLEM — M51.36 DDD (DEGENERATIVE DISC DISEASE), LUMBAR: Status: ACTIVE | Noted: 2022-05-27

## 2022-05-27 PROBLEM — N18.30 CHRONIC KIDNEY DISEASE (CKD), ACTIVE MEDICAL MANAGEMENT WITHOUT DIALYSIS, STAGE 3 (MODERATE) (HCC): Status: ACTIVE | Noted: 2022-05-27

## 2022-05-27 PROBLEM — M51.369 DDD (DEGENERATIVE DISC DISEASE), LUMBAR: Status: ACTIVE | Noted: 2022-05-27

## 2022-10-24 ENCOUNTER — OFFICE VISIT (OUTPATIENT)
Dept: PHYSICAL MEDICINE AND REHAB | Age: 77
End: 2022-10-24
Payer: MEDICARE

## 2022-10-24 VITALS
HEART RATE: 75 BPM | DIASTOLIC BLOOD PRESSURE: 69 MMHG | SYSTOLIC BLOOD PRESSURE: 130 MMHG | HEIGHT: 63 IN | BODY MASS INDEX: 37.56 KG/M2 | WEIGHT: 212 LBS

## 2022-10-24 DIAGNOSIS — M79.609 PAIN IN EXTREMITY, UNSPECIFIED EXTREMITY: ICD-10-CM

## 2022-10-24 DIAGNOSIS — M75.21 BICEPS TENDONITIS ON RIGHT: Primary | ICD-10-CM

## 2022-10-24 PROCEDURE — 99214 OFFICE O/P EST MOD 30 MIN: CPT | Performed by: PHYSICAL MEDICINE & REHABILITATION

## 2022-10-24 PROCEDURE — 20550 NJX 1 TENDON SHEATH/LIGAMENT: CPT | Performed by: PHYSICAL MEDICINE & REHABILITATION

## 2022-10-24 PROCEDURE — 1123F ACP DISCUSS/DSCN MKR DOCD: CPT | Performed by: PHYSICAL MEDICINE & REHABILITATION

## 2022-10-24 RX ORDER — LIDOCAINE HYDROCHLORIDE 10 MG/ML
1 INJECTION, SOLUTION INFILTRATION; PERINEURAL ONCE
Status: COMPLETED | OUTPATIENT
Start: 2022-10-24 | End: 2022-10-24

## 2022-10-24 RX ORDER — TRIAMCINOLONE ACETONIDE 40 MG/ML
40 INJECTION, SUSPENSION INTRA-ARTICULAR; INTRAMUSCULAR ONCE
Status: COMPLETED | OUTPATIENT
Start: 2022-10-24 | End: 2022-10-24

## 2022-10-24 RX ADMIN — LIDOCAINE HYDROCHLORIDE 1 ML: 10 INJECTION, SOLUTION INFILTRATION; PERINEURAL at 10:48

## 2022-10-24 RX ADMIN — TRIAMCINOLONE ACETONIDE 40 MG: 40 INJECTION, SUSPENSION INTRA-ARTICULAR; INTRAMUSCULAR at 10:48

## 2022-10-24 NOTE — PROGRESS NOTES
Mariam Chinchillailly, [id number] [facility]  [address]  Phone: [phone]  Fax: [phone]    PCP: Chloe Díaz DO  Date of visit: 10/24/22    Chief Complaint   Patient presents with    Shoulder Pain     New patient referred by Dr. Ryan Hastings       Dear Dr. Ryan Hastings,     Thank you for referring your patient to be seen. As you know,  Danae Love is a 78 y.o. female with past medical history as below who presents with right shoulder pain for 2 month(s). There was a sudden onset of pain after no known injury. Now, the pain is constant and occurs daily. The pain is rated Pain Score:  10 - Worst pain ever, is described as sharp, and is located in the right anterior shoulder with radiation to the biceps but nothing past elbow. The symptoms have been unchanged since onset. The pain is better with rest.  The pain is worse with  use . There is no associated numbness/tingling. There is no weakness. There is no bowel/bladder changes. The prior workup has included: none    The prior treatment has included:  PT: none, but has been doing cervical HEP    Chiropractic: none .    Modalities: ice with some relief   OTC Tylenol: none   NSAIDS: mobic    Opioids: none    Membrane stabilizers: none    Muscle relaxers: none    Previous injections: none    Previous surgery at this site: none    No Known Allergies    Current Outpatient Medications   Medication Sig Dispense Refill    atorvastatin (LIPITOR) 40 MG tablet TAKE ONE TABLET BY MOUTH EVERY DAY 90 tablet 1    spironolactone (ALDACTONE) 25 MG tablet Take 1 tablet by mouth daily 90 tablet 1    meloxicam (MOBIC) 7.5 MG tablet Take 1 tablet by mouth daily as needed for Pain 90 tablet 1    atenolol (TENORMIN) 25 MG tablet Take 0.5 tablets by mouth daily 90 tablet 1    meclizine (ANTIVERT) 12.5 MG tablet Take 1 tablet by mouth 3 times daily as needed for Dizziness 90 tablet 3    citalopram (CELEXA) 20 mg tablet Take 1 tablet by mouth daily 90 tablet 1    betamethasone dipropionate (DIPROLENE) 0.05 % ointment Apply topically daily. 45 g 2    Biotin 47740 MCG TABS Take 10,000 mcg by mouth daily      ketorolac (ACULAR) 0.5 % ophthalmic solution INSTILL ONE DROP INTO RIGHT EYE TWICE DAILY FOR 4 DAYS (Patient not taking: Reported on 10/24/2022)       Current Facility-Administered Medications   Medication Dose Route Frequency Provider Last Rate Last Admin    lidocaine 1 % injection 1 mL  1 mL Other Once Ysabel Donald DO        triamcinolone acetonide (KENALOG-40) injection 40 mg  40 mg Intra-artICUlar Once Claudia Wlid DO           Past Medical History:   Diagnosis Date    Carpal tunnel syndrome, right     Depression     Hypertension     Mitral valve regurgitation        Past Surgical History:   Procedure Laterality Date    BLADDER REPAIR      CARPAL TUNNEL RELEASE Right 2020    CARPAL TUNNEL RELEASE Right 11/3/2020    RIGHT WRIST CARPAL TUNNEL RELEASE performed by Heather Del Rio DO at [address]  2020    bilateral    [address]    6th and 7th fusion    CHOLECYSTECTOMY      ECHO COMPL W DOP COLOR FLOW  2013         HERNIA REPAIR      HYSTERECTOMY (CERVIX STATUS UNKNOWN)      NERVE BLOCK Left 6 9 16    sacroiliac joint #1       Family History   Problem Relation Age of Onset    Other Mother         CHF  at 80    Cancer Father          of pancreatic cancer at 67    Diabetes Sister     Other Sister         Blood clots    Stroke Sister     Stroke Brother          of massive stroke at 62    No Known Problems Brother        Social History     Tobacco Use    Smoking status: Never    Smokeless tobacco: Never   Vaping Use    Vaping Use: Never used   Substance Use Topics    Alcohol use: No    Drug use: No          Functional Status:    The patient is able to ambulate and perform activities of daily living without the use of an assistive device. ROS:    Constitutional: Denies fevers, chills, night sweats, unintentional weight loss     Skin: Denies rash or skin changes     Eyes: Denies vision changes    Ears/Nose/Throat: Denies nasal congestion or sore throat     Respiratory: Denies SOB or cough     Cardiovascular: Denies CP, palpitations, edema      Gastrointestinal: Denies abdominal pain,  N/V, constipation, or diarrhea    Genitourinary: Denies urinary symptoms    Neurologic: See HPI.     MSK: See HPI. Psychiatric: Denies sleep disturbance, anxiety, depression    Hematologic/Lymphatic/Immunologic: Denies bruising       Physical Exam:   Blood pressure 130/69, pulse 75, height 5' 3\" (1.6 m), weight 212 lb (96.2 kg), not currently breastfeeding. General: well developed and well nourished in no acute distress. HEENT: No rhinorrhea, sneezing, yawning, or lacrimation. No scleral icterus or conjunctival injection. Resp: symmetrical chest expansion, unlabored breathing, respirations unlabored. CV: Heart rate is regular. Peripheral pulses are palpable  Lymph: No visible regional lymphadenopathy. Skin: No rashes or ecchymosis. Normal turgor. Psych: Mood is calm. Affect is normal.   Ext: No edema noted     MSK:   Right Shoulder: No edema, erythema, ecchymosis, effusion, instability, mass or deformity. Decreased and painful ROM. No crepitus. No tenderness to palpation at the acromioclavicular joint, subacromial space, ttp right bicipital groove. Negative Bernabe-Strnage,  Impingement, Scarf,  Drop arm, Empty Can and positive Speeds. Negative Adson's. No palpable defect of the biceps muscle. No appreciable AC joint separation. Full painless cervical spine and elbow ROM. Negative Spurling. Neurological Exam:  Strength:   R  L  Deltoid   5  5  Biceps   5  5  Triceps  5  5  Wrist Ext  5  5  Finger Abd  5  5    Sensory:  Intact for light touch in all upper extremity dermatomes.        Reflexes:   R L  Biceps   (0) (0)  Triceps  (0) (0)  BR   (0) (0)    Imaging: (personally reviewed by me 10/24/22)    Impression:   Jaden Galvan is a 78 y.o. female     1. Biceps tendonitis on right    2. Pain in extremity, unspecified extremity        Plan:   HEP provided   Continue NSAID PRN   Inject right biceps tendon     The patient was educated about the diagnosis, prognosis, indications, risks and benefits of treatment. An opportunity to ask questions was given to the patient and questions were answered. The patient agreed to proceed with the recommended treatment as described above. Thank you for the consultation and for allowing me to participate in the care of this patient. Sincerely,     Meaghan Leblanc DO, FAAPMR   Board Certified Physical Medicine and Rehabilitation    After explaining the indications, risks, benefits and alternatives of a Right biceps tendon sheath injection, the patient agreed to proceed. A permit was signed and scanned to the media. The patient was placed in the seated position. The skin on the anterior shoulder was prepared with chloraprep. Using an aseptic no touch technique, a 22 gauge, 1.5\" needle with 1 mL of Kenalog 40 mg/mL and 1 mL of Xylocaine 1% was directed to the tendon sheath using us guidance. After negative aspiration, the medication was injected. Adequate hemostasis was achieved and a  bandage was applied to the injection site. The patient was monitored clinically after the injection and left the office without incident. The patient tolerated the procedure well and was educated in post injection care. There was post-injection pain reduction. US images are uploaded into the EMR separately.

## 2022-11-01 ENCOUNTER — TELEPHONE (OUTPATIENT)
Dept: PHYSICAL MEDICINE AND REHAB | Age: 77
End: 2022-11-01

## 2022-11-01 NOTE — TELEPHONE ENCOUNTER
Called patient for follow up phone call post right bicep injection, states she received about 80%effectiveness with no side effects. States she can move arm a little bit better.

## 2024-03-22 ENCOUNTER — APPOINTMENT (OUTPATIENT)
Dept: CT IMAGING | Age: 79
End: 2024-03-22
Payer: MEDICARE

## 2024-03-22 ENCOUNTER — HOSPITAL ENCOUNTER (EMERGENCY)
Age: 79
Discharge: HOME | End: 2024-03-22
Attending: EMERGENCY MEDICINE
Payer: MEDICARE

## 2024-03-22 VITALS
RESPIRATION RATE: 13 BRPM | SYSTOLIC BLOOD PRESSURE: 124 MMHG | OXYGEN SATURATION: 100 % | WEIGHT: 210 LBS | BODY MASS INDEX: 37.2 KG/M2 | TEMPERATURE: 97.7 F | DIASTOLIC BLOOD PRESSURE: 60 MMHG | HEART RATE: 75 BPM

## 2024-03-22 DIAGNOSIS — K92.1 HEMATOCHEZIA: ICD-10-CM

## 2024-03-22 DIAGNOSIS — E86.0 DEHYDRATION: ICD-10-CM

## 2024-03-22 DIAGNOSIS — R19.7 DIARRHEA, UNSPECIFIED TYPE: Primary | ICD-10-CM

## 2024-03-22 DIAGNOSIS — R10.30 LOWER ABDOMINAL PAIN: ICD-10-CM

## 2024-03-22 LAB
ABO + RH BLD: NORMAL
ALBUMIN SERPL-MCNC: 4 G/DL (ref 3.5–5.2)
ALP SERPL-CCNC: 120 U/L (ref 35–104)
ALT SERPL-CCNC: 70 U/L (ref 0–32)
ANION GAP SERPL CALCULATED.3IONS-SCNC: 19 MMOL/L (ref 7–16)
ARM BAND NUMBER: NORMAL
AST SERPL-CCNC: 49 U/L (ref 0–31)
BACTERIA URNS QL MICRO: ABNORMAL
BASOPHILS # BLD: 0.02 K/UL (ref 0–0.2)
BASOPHILS NFR BLD: 0 % (ref 0–2)
BILIRUB SERPL-MCNC: 0.9 MG/DL (ref 0–1.2)
BILIRUB UR QL STRIP: NEGATIVE
BLOOD BANK SAMPLE EXPIRATION: NORMAL
BLOOD GROUP ANTIBODIES SERPL: NEGATIVE
BUN SERPL-MCNC: 14 MG/DL (ref 6–23)
CALCIUM SERPL-MCNC: 9.1 MG/DL (ref 8.6–10.2)
CHLORIDE SERPL-SCNC: 104 MMOL/L (ref 98–107)
CLARITY UR: CLEAR
CO2 SERPL-SCNC: 18 MMOL/L (ref 22–29)
COLOR UR: YELLOW
CREAT SERPL-MCNC: 1 MG/DL (ref 0.5–1)
EKG ATRIAL RATE: 80 BPM
EKG P AXIS: 24 DEGREES
EKG P-R INTERVAL: 162 MS
EKG Q-T INTERVAL: 480 MS
EKG QRS DURATION: 78 MS
EKG QTC CALCULATION (BAZETT): 553 MS
EKG R AXIS: 6 DEGREES
EKG T AXIS: 110 DEGREES
EKG VENTRICULAR RATE: 80 BPM
EOSINOPHIL # BLD: 0.05 K/UL (ref 0.05–0.5)
EOSINOPHILS RELATIVE PERCENT: 1 % (ref 0–6)
EPI CELLS #/AREA URNS HPF: ABNORMAL /HPF
ERYTHROCYTE [DISTWIDTH] IN BLOOD BY AUTOMATED COUNT: 14.4 % (ref 11.5–15)
GFR SERPL CREATININE-BSD FRML MDRD: >60 ML/MIN/1.73M2
GLUCOSE SERPL-MCNC: 116 MG/DL (ref 74–99)
GLUCOSE UR STRIP-MCNC: NEGATIVE MG/DL
HCT VFR BLD AUTO: 47.3 % (ref 34–48)
HGB BLD-MCNC: 15.5 G/DL (ref 11.5–15.5)
HGB UR QL STRIP.AUTO: ABNORMAL
IMM GRANULOCYTES # BLD AUTO: <0.03 K/UL (ref 0–0.58)
IMM GRANULOCYTES NFR BLD: 0 % (ref 0–5)
INFLUENZA A BY PCR: NOT DETECTED
INFLUENZA B BY PCR: NOT DETECTED
KETONES UR STRIP-MCNC: 15 MG/DL
LEUKOCYTE ESTERASE UR QL STRIP: NEGATIVE
LIPASE SERPL-CCNC: 14 U/L (ref 13–60)
LYMPHOCYTES NFR BLD: 2.45 K/UL (ref 1.5–4)
LYMPHOCYTES RELATIVE PERCENT: 29 % (ref 20–42)
MCH RBC QN AUTO: 30.6 PG (ref 26–35)
MCHC RBC AUTO-ENTMCNC: 32.8 G/DL (ref 32–34.5)
MCV RBC AUTO: 93.5 FL (ref 80–99.9)
MONOCYTES NFR BLD: 0.54 K/UL (ref 0.1–0.95)
MONOCYTES NFR BLD: 6 % (ref 2–12)
NEUTROPHILS NFR BLD: 64 % (ref 43–80)
NEUTS SEG NFR BLD: 5.42 K/UL (ref 1.8–7.3)
NITRITE UR QL STRIP: NEGATIVE
PH UR STRIP: 5.5 [PH] (ref 5–9)
PLATELET # BLD AUTO: 340 K/UL (ref 130–450)
PMV BLD AUTO: 10.5 FL (ref 7–12)
POTASSIUM SERPL-SCNC: 3.7 MMOL/L (ref 3.5–5)
PROT SERPL-MCNC: 8.2 G/DL (ref 6.4–8.3)
PROT UR STRIP-MCNC: NEGATIVE MG/DL
RBC # BLD AUTO: 5.06 M/UL (ref 3.5–5.5)
RBC #/AREA URNS HPF: ABNORMAL /HPF
SODIUM SERPL-SCNC: 141 MMOL/L (ref 132–146)
SP GR UR STRIP: 1.01 (ref 1–1.03)
UROBILINOGEN UR STRIP-ACNC: 0.2 EU/DL (ref 0–1)
WBC #/AREA URNS HPF: ABNORMAL /HPF
WBC OTHER # BLD: 8.5 K/UL (ref 4.5–11.5)

## 2024-03-22 PROCEDURE — 85025 COMPLETE CBC W/AUTO DIFF WBC: CPT

## 2024-03-22 PROCEDURE — 81001 URINALYSIS AUTO W/SCOPE: CPT

## 2024-03-22 PROCEDURE — 96361 HYDRATE IV INFUSION ADD-ON: CPT

## 2024-03-22 PROCEDURE — 93010 ELECTROCARDIOGRAM REPORT: CPT | Performed by: INTERNAL MEDICINE

## 2024-03-22 PROCEDURE — 93005 ELECTROCARDIOGRAM TRACING: CPT

## 2024-03-22 PROCEDURE — 2580000003 HC RX 258

## 2024-03-22 PROCEDURE — 96360 HYDRATION IV INFUSION INIT: CPT

## 2024-03-22 PROCEDURE — 86901 BLOOD TYPING SEROLOGIC RH(D): CPT

## 2024-03-22 PROCEDURE — 86850 RBC ANTIBODY SCREEN: CPT

## 2024-03-22 PROCEDURE — 74177 CT ABD & PELVIS W/CONTRAST: CPT

## 2024-03-22 PROCEDURE — 86900 BLOOD TYPING SEROLOGIC ABO: CPT

## 2024-03-22 PROCEDURE — 80053 COMPREHEN METABOLIC PANEL: CPT

## 2024-03-22 PROCEDURE — 99285 EMERGENCY DEPT VISIT HI MDM: CPT

## 2024-03-22 PROCEDURE — 6360000004 HC RX CONTRAST MEDICATION: Performed by: RADIOLOGY

## 2024-03-22 PROCEDURE — 83690 ASSAY OF LIPASE: CPT

## 2024-03-22 PROCEDURE — 87502 INFLUENZA DNA AMP PROBE: CPT

## 2024-03-22 RX ORDER — 0.9 % SODIUM CHLORIDE 0.9 %
1000 INTRAVENOUS SOLUTION INTRAVENOUS ONCE
Status: COMPLETED | OUTPATIENT
Start: 2024-03-22 | End: 2024-03-22

## 2024-03-22 RX ORDER — 0.9 % SODIUM CHLORIDE 0.9 %
500 INTRAVENOUS SOLUTION INTRAVENOUS ONCE
Status: DISCONTINUED | OUTPATIENT
Start: 2024-03-22 | End: 2024-03-22

## 2024-03-22 RX ADMIN — SODIUM CHLORIDE 1000 ML: 9 INJECTION, SOLUTION INTRAVENOUS at 09:05

## 2024-03-22 RX ADMIN — SODIUM CHLORIDE 1000 ML: 9 INJECTION, SOLUTION INTRAVENOUS at 10:34

## 2024-03-22 RX ADMIN — IOPAMIDOL 75 ML: 755 INJECTION, SOLUTION INTRAVENOUS at 09:54

## 2024-03-22 ASSESSMENT — PAIN DESCRIPTION - ORIENTATION: ORIENTATION: LOWER

## 2024-03-22 ASSESSMENT — PAIN DESCRIPTION - FREQUENCY: FREQUENCY: INTERMITTENT

## 2024-03-22 ASSESSMENT — PAIN DESCRIPTION - DESCRIPTORS: DESCRIPTORS: ACHING

## 2024-03-22 ASSESSMENT — PAIN DESCRIPTION - PAIN TYPE: TYPE: ACUTE PAIN

## 2024-03-22 ASSESSMENT — PAIN DESCRIPTION - LOCATION: LOCATION: ABDOMEN

## 2024-03-22 ASSESSMENT — PAIN - FUNCTIONAL ASSESSMENT: PAIN_FUNCTIONAL_ASSESSMENT: 0-10

## 2024-03-22 ASSESSMENT — PAIN SCALES - GENERAL: PAINLEVEL_OUTOF10: 1

## 2024-03-22 NOTE — ED PROVIDER NOTES
[facility] EMERGENCY DEPARTMENT  EMERGENCY DEPARTMENT ENCOUNTER        Pt Name: Kiara Mantilla  MRN: [id number]  Birthdate 1945  Date of evaluation: 3/22/2024  Provider: Mekhi Haro DO  PCP: Manuel Mariano DO  Note Started: 8:53 AM EDT 3/22/24    CHIEF COMPLAINT       Chief Complaint   Patient presents with    Diarrhea    Abdominal Pain     Pt presents today for diarrhea and abd pain. Pt states that diarrhea started yesterday morning. Pt states large amounts. Stool was dark and tarry. Pt has hx of diverticulosis. Some abd pain.       HISTORY OF PRESENT ILLNESS: 1 or more Elements   History From: patient    Limitations to history : None    Kiara Mantilla is a 78 y.o. female who presents to the emergency department for lower abdominal pain and multiple episodes of diarrhea that started yesterday.  Patient has a history of diverticulitis and has had scopes in the past for possible GI bleed, last was approximately 6 years ago.  She does not take any blood thinners.  She localized the pain to the lower mid abdomen when she would have diarrhea.  No epigastric pain.  No nausea or vomiting.  History of appendectomy, cholecystectomy, and full hysterectomy. Patient denies fever, chills, headache, shortness of breath, chest pain, dysuria, hematuria, hematochezia, and melena.  She does report some generalized fatigue.     Nursing Notes were all reviewed and agreed with or any disagreements were addressed in the HPI.        REVIEW OF SYSTEMS :           Positives and Pertinent negatives as per HPI.     SURGICAL HISTORY     Past Surgical History:   Procedure Laterality Date    BLADDER REPAIR      CARPAL TUNNEL RELEASE Right 11/03/2020    CARPAL TUNNEL RELEASE Right 11/3/2020    RIGHT WRIST CARPAL TUNNEL RELEASE performed by Kumar Case DO at [facility] OR    CATARACT REMOVAL WITH IMPLANT  04/2020    bilateral    CERVICAL SPINE SURGERY  1980    6th and 7th fusion    CHOLECYSTECTOMY

## 2024-11-25 ENCOUNTER — TELEPHONE (OUTPATIENT)
Dept: INTERVENTIONAL RADIOLOGY/VASCULAR | Age: 79
End: 2024-11-25

## 2024-11-25 NOTE — TELEPHONE ENCOUNTER
Spoke with patient and confirmed vascular testing appointment on 11/26/2024 at 10:00 am. Instructed patient to arrive 15 minutes prior to appointment time, Enter through Entrance B, register to right of entrance, and report to Cardiac Services for test. Patient verbalized understanding. Questions answered.

## 2024-11-26 ENCOUNTER — HOSPITAL ENCOUNTER (OUTPATIENT)
Age: 79
Discharge: HOME | End: 2024-11-28
Payer: MEDICARE

## 2024-11-26 ENCOUNTER — HOSPITAL ENCOUNTER (OUTPATIENT)
Dept: INTERVENTIONAL RADIOLOGY/VASCULAR | Age: 79
Discharge: HOME | End: 2024-11-28
Payer: MEDICARE

## 2024-11-26 DIAGNOSIS — I10 ESSENTIAL HYPERTENSION: ICD-10-CM

## 2024-11-26 DIAGNOSIS — R01.1 HEART MURMUR: ICD-10-CM

## 2024-11-26 DIAGNOSIS — R42 DIZZINESS: ICD-10-CM

## 2024-11-26 PROCEDURE — 93880 EXTRACRANIAL BILAT STUDY: CPT

## 2024-11-26 PROCEDURE — 93306 TTE W/DOPPLER COMPLETE: CPT

## 2024-11-27 LAB
ECHO AO ASC DIAM: 2.7 CM
ECHO AV AREA PEAK VELOCITY: 2.7 CM2
ECHO AV AREA VTI: 2.7 CM2
ECHO AV MEAN GRADIENT: 2 MMHG
ECHO AV MEAN VELOCITY: 0.6 M/S
ECHO AV PEAK GRADIENT: 4 MMHG
ECHO AV PEAK VELOCITY: 0.9 M/S
ECHO AV VELOCITY RATIO: 0.89
ECHO AV VTI: 23.3 CM
ECHO EST RA PRESSURE: 3 MMHG
ECHO LA DIAMETER: 3.7 CM
ECHO LA VOL A-L A2C: 25 ML (ref 22–52)
ECHO LA VOL A-L A4C: 21 ML (ref 22–52)
ECHO LA VOL BP: 23 ML (ref 22–52)
ECHO LA VOL MOD A2C: 25 ML (ref 22–52)
ECHO LA VOL MOD A4C: 20 ML (ref 22–52)
ECHO LA VOLUME AREA LENGTH: 24 ML
ECHO LV EDV A2C: 64 ML
ECHO LV EDV A4C: 82 ML
ECHO LV EDV BP: 76 ML (ref 56–104)
ECHO LV EF PHYSICIAN: 55 %
ECHO LV EJECTION FRACTION A2C: 56 %
ECHO LV EJECTION FRACTION A4C: 47 %
ECHO LV ESV A2C: 28 ML
ECHO LV ESV A4C: 43 ML
ECHO LV ESV BP: 37 ML (ref 19–49)
ECHO LV FRACTIONAL SHORTENING: 19 % (ref 28–44)
ECHO LV INTERNAL DIMENSION DIASTOLIC: 4.2 CM (ref 3.9–5.3)
ECHO LV INTERNAL DIMENSION SYSTOLIC: 3.4 CM
ECHO LV ISOVOLUMETRIC RELAXATION TIME (IVRT): 137 MS
ECHO LV IVSD: 1.1 CM (ref 0.6–0.9)
ECHO LV MASS 2D: 157.1 G (ref 67–162)
ECHO LV POSTERIOR WALL DIASTOLIC: 1.1 CM (ref 0.6–0.9)
ECHO LV RELATIVE WALL THICKNESS RATIO: 0.52
ECHO LVOT AREA: 3.1 CM2
ECHO LVOT AV VTI INDEX: 0.87
ECHO LVOT DIAM: 2 CM
ECHO LVOT MEAN GRADIENT: 2 MMHG
ECHO LVOT PEAK GRADIENT: 3 MMHG
ECHO LVOT PEAK VELOCITY: 0.8 M/S
ECHO LVOT SV: 63.4 ML
ECHO LVOT VTI: 20.2 CM
ECHO MV A VELOCITY: 1.14 M/S
ECHO MV AREA PHT: 2.2 CM2
ECHO MV AREA VTI: 2.3 CM2
ECHO MV E DECELERATION TIME (DT): 255.3 MS
ECHO MV E VELOCITY: 0.62 M/S
ECHO MV E/A RATIO: 0.54
ECHO MV LVOT VTI INDEX: 1.38
ECHO MV MAX VELOCITY: 1.2 M/S
ECHO MV MEAN GRADIENT: 1 MMHG
ECHO MV MEAN VELOCITY: 0.5 M/S
ECHO MV PEAK GRADIENT: 6 MMHG
ECHO MV PRESSURE HALF TIME (PHT): 99.5 MS
ECHO MV VTI: 27.9 CM
ECHO PULMONARY ARTERY END DIASTOLIC PRESSURE: 3 MMHG
ECHO PV MAX VELOCITY: 0.8 M/S
ECHO PV MEAN GRADIENT: 1 MMHG
ECHO PV MEAN VELOCITY: 0.6 M/S
ECHO PV PEAK GRADIENT: 2 MMHG
ECHO PV REGURGITANT MAX VELOCITY: 0.9 M/S
ECHO PV VTI: 15.6 CM
ECHO PVEIN A DURATION: 137 MS
ECHO PVEIN A VELOCITY: 0.3 M/S
ECHO PVEIN PEAK D VELOCITY: 0.2 M/S
ECHO PVEIN PEAK S VELOCITY: 0.5 M/S
ECHO PVEIN S/D RATIO: 2.5
ECHO RV INTERNAL DIMENSION: 3.1 CM
ECHO RV LONGITUDINAL DIMENSION: 7.2 CM
ECHO RV MID DIMENSION: 2.5 CM

## 2024-11-27 PROCEDURE — 93306 TTE W/DOPPLER COMPLETE: CPT | Performed by: INTERNAL MEDICINE

## (undated) DEVICE — PEN: MARKING STD 100/CS: Brand: MEDICAL ACTION INDUSTRIES

## (undated) DEVICE — TIBURON EXTREMITY SHEET: Brand: CONVERTORS

## (undated) DEVICE — GAUZE,SPONGE,4"X4",16PLY,XRAY,STRL,LF: Brand: MEDLINE

## (undated) DEVICE — 20 ML SYRINGE REGULAR TIP: Brand: MONOJECT

## (undated) DEVICE — Z DISCONTINUED USE 2275686 GLOVE SURG SZ 8 L12IN FNGR THK13MIL WHT ISOLEX POLYISOPRENE

## (undated) DEVICE — Z CONVERTED USE 2275207 CLOTH PREP W7.5XL7.5IN 2% CHG SKIN ALC AND RNS FREE

## (undated) DEVICE — GLOVE ORANGE PI 8   MSG9080

## (undated) DEVICE — NEEDLE HYPO 18GA L1.5IN PNK POLYPR HUB S STL THN WALL FILL

## (undated) DEVICE — BANDAGE,GAUZE,BULKEE II,4.5"X4.1YD,STRL: Brand: MEDLINE

## (undated) DEVICE — DRESSING GZ XRFRM 4X4(25/BX 6BX/CS)

## (undated) DEVICE — GOWN SURG XL SMS FAB NONREINFORCED RAGLAN SLV HK LOOP CLSR

## (undated) DEVICE — TOWEL OR BLUEE 16X26IN ST 8 PACK ORB08 16X26ORTWL

## (undated) DEVICE — SUTURE NONABSORBABLE MONOFILAMENT 4-0 PS-2 18 IN BLU PROLENE 8682H

## (undated) DEVICE — CAUTERY ES 1800DEG HI TEMP ELONG TIP

## (undated) DEVICE — HANDLE CVR PATENTED RETENTION DISC STRL LIGHT SHLD

## (undated) DEVICE — GAUZE,SPONGE,4"X4",16PLY,STRL,LF,10/TRAY: Brand: MEDLINE

## (undated) DEVICE — SOLUTION IRRIG 1000ML 09% SOD CHL USP PIC PLAS CONTAINER

## (undated) DEVICE — HOOK LOCK LATEX FREE ELASTIC BANDAGE 3INX5YD

## (undated) DEVICE — INTENDED FOR TISSUE SEPARATION, AND OTHER PROCEDURES THAT REQUIRE A SHARP SURGICAL BLADE TO PUNCTURE OR CUT.: Brand: BARD-PARKER ® STAINLESS STEEL BLADES

## (undated) DEVICE — BASIC PACK: Brand: CONVERTORS

## (undated) DEVICE — 1810 FOAM BLOCK NEEDLE COUNTER: Brand: DEVON

## (undated) DEVICE — CHLORAPREP 26ML ORANGE